# Patient Record
Sex: MALE | Race: WHITE | NOT HISPANIC OR LATINO | Employment: OTHER | URBAN - METROPOLITAN AREA
[De-identification: names, ages, dates, MRNs, and addresses within clinical notes are randomized per-mention and may not be internally consistent; named-entity substitution may affect disease eponyms.]

---

## 2017-01-12 ENCOUNTER — APPOINTMENT (OUTPATIENT)
Dept: CT IMAGING | Facility: CLINIC | Age: 62
End: 2017-01-12
Attending: EMERGENCY MEDICINE
Payer: MEDICARE

## 2017-01-12 ENCOUNTER — HOSPITAL ENCOUNTER (EMERGENCY)
Facility: CLINIC | Age: 62
Discharge: HOME | End: 2017-01-13
Attending: EMERGENCY MEDICINE | Admitting: EMERGENCY MEDICINE
Payer: MEDICARE

## 2017-01-12 DIAGNOSIS — F10.929 ALCOHOL INTOXICATION, WITH UNSPECIFIED COMPLICATION (H): ICD-10-CM

## 2017-01-12 LAB
ALBUMIN SERPL-MCNC: 3.3 G/DL (ref 3.4–5)
ALCOHOL BREATH TEST: 0.03 (ref 0–0.01)
ALP SERPL-CCNC: 81 U/L (ref 40–150)
ALT SERPL W P-5'-P-CCNC: 42 U/L (ref 0–70)
ANION GAP SERPL CALCULATED.3IONS-SCNC: 7 MMOL/L (ref 3–14)
AST SERPL W P-5'-P-CCNC: 43 U/L (ref 0–45)
BASOPHILS # BLD AUTO: 0.1 10E9/L (ref 0–0.2)
BASOPHILS NFR BLD AUTO: 0.9 %
BILIRUB SERPL-MCNC: 0.3 MG/DL (ref 0.2–1.3)
BUN SERPL-MCNC: 14 MG/DL (ref 7–30)
CALCIUM SERPL-MCNC: 8.3 MG/DL (ref 8.5–10.1)
CHLORIDE SERPL-SCNC: 107 MMOL/L (ref 94–109)
CO2 SERPL-SCNC: 32 MMOL/L (ref 20–32)
CREAT SERPL-MCNC: 1 MG/DL (ref 0.66–1.25)
DIFFERENTIAL METHOD BLD: ABNORMAL
EOSINOPHIL # BLD AUTO: 0.4 10E9/L (ref 0–0.7)
EOSINOPHIL NFR BLD AUTO: 4.7 %
ERYTHROCYTE [DISTWIDTH] IN BLOOD BY AUTOMATED COUNT: 14.7 % (ref 10–15)
ETHANOL SERPL-MCNC: 0.33 G/DL
GFR SERPL CREATININE-BSD FRML MDRD: 75 ML/MIN/1.7M2
GLUCOSE SERPL-MCNC: 94 MG/DL (ref 70–99)
HCT VFR BLD AUTO: 41.4 % (ref 40–53)
HGB BLD-MCNC: 13.8 G/DL (ref 13.3–17.7)
IMM GRANULOCYTES # BLD: 0 10E9/L (ref 0–0.4)
IMM GRANULOCYTES NFR BLD: 0.1 %
INR PPP: 0.85 (ref 0.86–1.14)
LYMPHOCYTES # BLD AUTO: 1.6 10E9/L (ref 0.8–5.3)
LYMPHOCYTES NFR BLD AUTO: 20.1 %
MAGNESIUM SERPL-MCNC: 2.4 MG/DL (ref 1.6–2.3)
MCH RBC QN AUTO: 33.2 PG (ref 26.5–33)
MCHC RBC AUTO-ENTMCNC: 33.3 G/DL (ref 31.5–36.5)
MCV RBC AUTO: 100 FL (ref 78–100)
MONOCYTES # BLD AUTO: 0.8 10E9/L (ref 0–1.3)
MONOCYTES NFR BLD AUTO: 10 %
NEUTROPHILS # BLD AUTO: 5 10E9/L (ref 1.6–8.3)
NEUTROPHILS NFR BLD AUTO: 64.2 %
NRBC # BLD AUTO: 0 10*3/UL
NRBC BLD AUTO-RTO: 0 /100
PLATELET # BLD AUTO: 178 10E9/L (ref 150–450)
POTASSIUM SERPL-SCNC: 3.7 MMOL/L (ref 3.4–5.3)
PROT SERPL-MCNC: 6.6 G/DL (ref 6.8–8.8)
RBC # BLD AUTO: 4.16 10E12/L (ref 4.4–5.9)
SODIUM SERPL-SCNC: 146 MMOL/L (ref 133–144)
WBC # BLD AUTO: 7.8 10E9/L (ref 4–11)

## 2017-01-12 PROCEDURE — 36415 COLL VENOUS BLD VENIPUNCTURE: CPT | Performed by: EMERGENCY MEDICINE

## 2017-01-12 PROCEDURE — 80320 DRUG SCREEN QUANTALCOHOLS: CPT | Performed by: EMERGENCY MEDICINE

## 2017-01-12 PROCEDURE — 99285 EMERGENCY DEPT VISIT HI MDM: CPT | Mod: 25 | Performed by: EMERGENCY MEDICINE

## 2017-01-12 PROCEDURE — 83735 ASSAY OF MAGNESIUM: CPT | Performed by: EMERGENCY MEDICINE

## 2017-01-12 PROCEDURE — 85025 COMPLETE CBC W/AUTO DIFF WBC: CPT | Performed by: EMERGENCY MEDICINE

## 2017-01-12 PROCEDURE — 72125 CT NECK SPINE W/O DYE: CPT

## 2017-01-12 PROCEDURE — 80053 COMPREHEN METABOLIC PANEL: CPT | Performed by: EMERGENCY MEDICINE

## 2017-01-12 PROCEDURE — 82075 ASSAY OF BREATH ETHANOL: CPT | Performed by: EMERGENCY MEDICINE

## 2017-01-12 PROCEDURE — 99284 EMERGENCY DEPT VISIT MOD MDM: CPT | Mod: Z6 | Performed by: EMERGENCY MEDICINE

## 2017-01-12 PROCEDURE — 70450 CT HEAD/BRAIN W/O DYE: CPT

## 2017-01-12 PROCEDURE — 85610 PROTHROMBIN TIME: CPT | Performed by: EMERGENCY MEDICINE

## 2017-01-12 ASSESSMENT — ENCOUNTER SYMPTOMS
EYE REDNESS: 0
ABDOMINAL PAIN: 0
CONFUSION: 0
DIFFICULTY URINATING: 0
NECK STIFFNESS: 0
ARTHRALGIAS: 0
SHORTNESS OF BREATH: 0
HEADACHES: 0
FEVER: 0
COLOR CHANGE: 0

## 2017-01-12 NOTE — ED AVS SNAPSHOT
[facility], [place], Emergency Department    [address] [place] [address]    Phone:  [phone]    Fax:  [phone]                                       Richard Montoya   MRN: [id number]    Department:  [facility], Emergency Department   Date of Visit:  1/12/2017           After Visit Summary Signature Page     I have received my discharge instructions, and my questions have been answered. I have discussed any challenges I see with this plan with the nurse or doctor.    ..........................................................................................................................................  Patient/Patient Representative Signature      ..........................................................................................................................................  Patient Representative Print Name and Relationship to Patient    ..................................................               ................................................  Date                                            Time    ..........................................................................................................................................  Reviewed by Signature/Title    ...................................................              ..............................................  Date                                                            Time

## 2017-01-12 NOTE — ED AVS SNAPSHOT
[facility], Emergency Department    [address]    [place] [address]    Phone:  [phone]    Fax:  [phone]                                       Richard Montoya   MRN: [id number]    Department:  [facility], Emergency Department   Date of Visit:  1/12/2017           Patient Information     Date Of Birth          1955        Your diagnoses for this visit were:     Alcohol intoxication, with unspecified complication (H)        You were seen by Bhanu Quiñones MD.        Discharge Instructions       You are being discharged to [facility].      Discharge References/Attachments     ALCOHOL ABUSE (ENGLISH)      24 Hour Appointment Hotline       To make an appointment at any [facility] clinic, call [phone] ([phone]). If you don't have a family doctor or clinic, we will help you find one. [facility] clinics are conveniently located to serve the needs of you and your family.             Review of your medicines      Our records show that you are taking the medicines listed below. If these are incorrect, please call your family doctor or clinic.        Dose / Directions Last dose taken    acetaminophen 500 MG tablet   Commonly known as:  TYLENOL   Dose:  500 mg        Take 1 tablet (500 mg) by mouth 4 times daily   Refills:  0        albuterol 108 (90 BASE) MCG/ACT Inhaler   Commonly known as:  PROAIR HFA/PROVENTIL HFA/VENTOLIN HFA   Dose:  2 puff   Quantity:  1 Inhaler        Inhale 2 puffs into the lungs every 4 hours as needed for shortness of breath / dyspnea   Refills:  0        cholecalciferol 1000 UNITS Tabs   Dose:  1000 Units   Quantity:  90 tablet        Take 1,000 Units by mouth daily   Refills:  1        fluticasone-salmeterol 500-50 MCG/DOSE diskus inhaler   Commonly known as:  ADVAIR   Dose:  1 puff   Quantity:  60 Inhaler        Inhale 1 puff into the lungs every 12 hours   Refills:  1        folic acid 1 MG tablet   Commonly known as:  FOLVITE   Dose:  1 mg    Quantity:  30 tablet        Take 1 tablet (1 mg) by mouth daily   Refills:  0        furosemide 20 MG tablet   Commonly known as:  LASIX   Dose:  20 mg   Quantity:  30 tablet        Take 1 tablet (20 mg) by mouth daily   Refills:  3        gabapentin 300 MG capsule   Commonly known as:  NEURONTIN   Dose:  300 mg   Quantity:  90 capsule        Take 1 capsule (300 mg) by mouth 3 times daily   Refills:  1        loperamide 2 MG capsule   Commonly known as:  IMODIUM   Dose:  2 mg        Take 2 mg by mouth 4 times daily as needed for diarrhea   Refills:  0        multivitamin, therapeutic with minerals Tabs tablet   Dose:  1 tablet   Quantity:  30 each        Take 1 tablet by mouth daily   Refills:  0        nicotine polacrilex 2 MG lozenge   Commonly known as:  COMMIT   Dose:  4 mg   Quantity:  360 lozenge        Place 2 lozenges (4 mg) inside cheek every hour as needed for smoking cessation   Refills:  0        oxyCODONE 5 MG IR tablet   Commonly known as:  ROXICODONE   Dose:  5 mg   Quantity:  40 tablet        Take 1 tablet (5 mg) by mouth 4 times daily as needed for moderate to severe pain   Refills:  0        senna-docusate 8.6-50 MG per tablet   Commonly known as:  SENOKOT-S;PERICOLACE   Dose:  2 tablet   Quantity:  100 tablet        Take 2 tablets by mouth 2 times daily as needed for constipation   Refills:  0        sodium chloride 0.65 % nasal spray   Commonly known as:  OCEAN   Dose:  1 spray   Quantity:  1 Bottle        Spray 1 spray into both nostrils daily as needed for congestion   Refills:  2        tiotropium 18 MCG capsule   Commonly known as:  SPIRIVA   Dose:  18 mcg   Quantity:  30 capsule        Inhale 1 capsule (18 mcg) into the lungs daily   Refills:  1          ASK your doctor about these medications        Dose / Directions Last dose taken    * Aspirin 81 MG Tbdp   Dose:  81 mg   What changed:  Another medication with the same name was added. Make sure you understand how and when to take each.    Quantity:  30 tablet   Ask about: Which instructions should I use?        Take 81 mg by mouth daily   Refills:  1        * aspirin 81 MG tablet   Dose:  81 mg   What changed:  You were already taking a medication with the same name, and this prescription was added. Make sure you understand how and when to take each.   Quantity:  3 tablet   Ask about: Which instructions should I use?        Take 1 tablet (81 mg) by mouth daily   Refills:  0        * citalopram 20 MG tablet   Commonly known as:  celeXA   Dose:  20 mg   What changed:  Another medication with the same name was added. Make sure you understand how and when to take each.   Quantity:  30 tablet   Ask about: Which instructions should I use?        Take 1 tablet (20 mg) by mouth daily   Refills:  1        * citalopram 20 MG tablet   Commonly known as:  celeXA   Dose:  20 mg   What changed:  You were already taking a medication with the same name, and this prescription was added. Make sure you understand how and when to take each.   Quantity:  3 tablet   Ask about: Which instructions should I use?        Take 1 tablet (20 mg) by mouth daily   Refills:  0        * lisinopril 2.5 MG tablet   Commonly known as:  PRINIVIL/Zestril   Dose:  2.5 mg   What changed:  Another medication with the same name was added. Make sure you understand how and when to take each.   Quantity:  30 tablet   Ask about: Which instructions should I use?        Take 1 tablet (2.5 mg) by mouth daily   Refills:  3        * lisinopril 2.5 MG tablet   Commonly known as:  PRINIVIL/Zestril   Dose:  2.5 mg   What changed:  You were already taking a medication with the same name, and this prescription was added. Make sure you understand how and when to take each.   Quantity:  3 tablet   Ask about: Which instructions should I use?        Take 1 tablet (2.5 mg) by mouth daily   Refills:  0        * metoprolol 50 MG 24 hr tablet   Commonly known as:  TOPROL-XL   Dose:  50 mg   What changed:   Another medication with the same name was added. Make sure you understand how and when to take each.   Quantity:  30 tablet   Ask about: Which instructions should I use?        Take 1 tablet (50 mg) by mouth daily   Refills:  3        * metoprolol 50 MG 24 hr tablet   Commonly known as:  TOPROL-XL   Dose:  50 mg   What changed:  You were already taking a medication with the same name, and this prescription was added. Make sure you understand how and when to take each.   Quantity:  3 tablet   Ask about: Which instructions should I use?        Take 1 tablet (50 mg) by mouth daily   Refills:  0        * Notice:  This list has 8 medication(s) that are the same as other medications prescribed for you. Read the directions carefully, and ask your doctor or other care provider to review them with you.            Prescriptions were sent or printed at these locations (4 Prescriptions)                   Other Prescriptions                Printed at Department/Unit printer (4 of 4)         metoprolol (TOPROL-XL) 50 MG 24 hr tablet               lisinopril (PRINIVIL/ZESTRIL) 2.5 MG tablet               citalopram (CELEXA) 20 MG tablet               aspirin 81 MG tablet                Procedures and tests performed during your visit     Alcohol breath test POCT    Alcohol level blood    CBC with platelets differential    Cervical spine CT w/o contrast    Comprehensive metabolic panel    Head CT w/o contrast    INR    MSSA Score, VS, and Pain Assessment on admission and per protocol    Magnesium    Peripheral IV catheter      Orders Needing Specimen Collection     Ordered          01/12/17 2003  Drug abuse screen 6 urine (tox) - STAT, [facility]: STAT, Needs to be Collected     Scheduled Task Status   01/12/17 2003 Collect Drug abuse screen 6 urine (tox) Open   Order Class:  PCU Collect                  Pending Results     No orders found for last 2 day(s).            Pending Culture Results     No orders found for last 2 day(s).        "     Thank you for choosing [facility]       Thank you for choosing [facility] for your care. Our goal is always to provide you with excellent care. Hearing back from our patients is one way we can continue to improve our services. Please take a few minutes to complete the written survey that you may receive in the mail after you visit with us. Thank you!        [employer] Information     [employer] lets you send messages to your doctor, view your test results, renew your prescriptions, schedule appointments and more. To sign up, go to [email or web address] . Click on \"Log in\" on the left side of the screen, which will take you to the Welcome page. Then click on \"Sign up Now\" on the right side of the page.     You will be asked to enter the access code listed below, as well as some personal information. Please follow the directions to create your username and password.     Your access code is: [email or web address]  Expires: 2017  8:21 AM     Your access code will  in 90 days. If you need help or a new code, please call your [facility] clinic or [phone].        Care EveryWhere ID     This is your Care EveryWhere ID. This could be used by other organizations to access your [facility] medical records  [id number]        After Visit Summary       This is your record. Keep this with you and show to your community pharmacist(s) and doctor(s) at your next visit.                  "

## 2017-01-13 ENCOUNTER — TELEPHONE (OUTPATIENT)
Dept: BEHAVIORAL HEALTH | Facility: CLINIC | Age: 62
End: 2017-01-13

## 2017-01-13 VITALS
OXYGEN SATURATION: 96 % | HEART RATE: 68 BPM | TEMPERATURE: 96.5 F | DIASTOLIC BLOOD PRESSURE: 89 MMHG | RESPIRATION RATE: 18 BRPM | SYSTOLIC BLOOD PRESSURE: 150 MMHG

## 2017-01-13 PROCEDURE — A9270 NON-COVERED ITEM OR SERVICE: HCPCS | Mod: GY | Performed by: EMERGENCY MEDICINE

## 2017-01-13 PROCEDURE — 25000132 ZZH RX MED GY IP 250 OP 250 PS 637: Mod: GY | Performed by: EMERGENCY MEDICINE

## 2017-01-13 RX ORDER — DIAZEPAM 5 MG
5-20 TABLET ORAL EVERY 30 MIN PRN
Status: DISCONTINUED | OUTPATIENT
Start: 2017-01-13 | End: 2017-01-13 | Stop reason: HOSPADM

## 2017-01-13 RX ORDER — LISINOPRIL 2.5 MG/1
2.5 TABLET ORAL DAILY
Qty: 3 TABLET | Refills: 0 | Status: ON HOLD | OUTPATIENT
Start: 2017-01-13 | End: 2017-09-21

## 2017-01-13 RX ORDER — CITALOPRAM HYDROBROMIDE 20 MG/1
20 TABLET ORAL DAILY
Qty: 3 TABLET | Refills: 0 | Status: ON HOLD | OUTPATIENT
Start: 2017-01-13 | End: 2017-09-21

## 2017-01-13 RX ORDER — DIAZEPAM 10 MG
10 TABLET ORAL ONCE
Status: COMPLETED | OUTPATIENT
Start: 2017-01-13 | End: 2017-01-13

## 2017-01-13 RX ORDER — METOPROLOL SUCCINATE 50 MG/1
50 TABLET, EXTENDED RELEASE ORAL DAILY
Qty: 3 TABLET | Refills: 0 | Status: ON HOLD | OUTPATIENT
Start: 2017-01-13 | End: 2017-09-21

## 2017-01-13 RX ADMIN — DIAZEPAM 10 MG: 10 TABLET ORAL at 04:34

## 2017-01-13 RX ADMIN — DIAZEPAM 10 MG: 10 TABLET ORAL at 06:15

## 2017-01-13 RX ADMIN — DIAZEPAM 10 MG: 5 TABLET ORAL at 08:15

## 2017-01-13 NOTE — ED PROVIDER NOTES
History     Chief Complaint   Patient presents with     Alcohol Intoxication     pt lying in tirado outside his apartment, intoxicated unable to stand on own. olaf state it happens almost daily recently     HPI  Richard Montoya is a 61 year old male who presents to the emergency department with alcohol intoxication.  The patient was found lying in the tirado outside his apartment.  The patient states he fell down.  The patient states that he did strike his head.  He has an abrasion on his left index finger that he states happened several days ago.  He denies any left hand pain.  Patient denies chest pain or dyspnea.  He denies abdominal pain.  Denies nausea and vomiting.  Patient reports a history of daily alcohol use.  He states he drinks 2 pints of vodka per day.  He states that he becomes tremulous when he attempts to quit.  He denies any other drug use.  Patient denies depression and suicide ideation.  Patient states that he's also had a history of alcohol withdrawal seizure ×1 in the past.  He denies a history of DTs.    I have reviewed the Medications, Allergies, Past Medical and Surgical History, and Social History in the Epic system.    Review of Systems   Constitutional: Negative for fever.   HENT: Negative for congestion.    Eyes: Negative for redness.   Respiratory: Negative for shortness of breath.    Cardiovascular: Negative for chest pain.   Gastrointestinal: Negative for abdominal pain.   Genitourinary: Negative for difficulty urinating.   Musculoskeletal: Negative for arthralgias and neck stiffness.   Skin: Negative for color change.   Neurological: Negative for headaches.   Psychiatric/Behavioral: Negative for confusion.   All other systems reviewed and are negative.      Physical Exam   BP: 117/78 mmHg  Pulse: 90  Resp: 14  SpO2: 96 %  Physical Exam   Constitutional: He is oriented to person, place, and time. He appears well-developed and well-nourished. No distress.   HENT:   Head: Normocephalic.        Right Ear: External ear normal.   Left Ear: External ear normal.   Mouth/Throat: Oropharynx is clear and moist. No oropharyngeal exudate.   Eyes: Pupils are equal, round, and reactive to light. No scleral icterus.   Cardiovascular: Normal heart sounds and intact distal pulses.  An irregularly irregular rhythm present.   Pulmonary/Chest: Effort normal and breath sounds normal. No respiratory distress.   Abdominal: Soft. Bowel sounds are normal. There is no tenderness.   Musculoskeletal: Normal range of motion. He exhibits no edema or tenderness.   Neurological: He is alert and oriented to person, place, and time. He has normal strength. No cranial nerve deficit. GCS eye subscore is 4. GCS verbal subscore is 4. GCS motor subscore is 6.   Skin: Skin is warm. No rash noted. He is not diaphoretic.   Psychiatric: His speech is slurred. He expresses no suicidal ideation.   Nursing note and vitals reviewed.      ED Course   Procedures            Critical Care time:    Results for orders placed or performed during the hospital encounter of 01/12/17   Head CT w/o contrast    Narrative    CT OF THE HEAD WITHOUT CONTRAST 1/12/2017 10:14 PM     COMPARISON: Head CT 3/9/2016    HISTORY: Pain, trauma    TECHNIQUE: 5 mm thick axial CT images of the head were acquired  without IV contrast material.    FINDINGS: A chronic moderate-sized right MCA territory infarct is  again noted. There is mild diffuse cerebral volume loss. There are  subtle patchy areas of decreased density in the cerebral white matter  bilaterally that are consistent with sequela of chronic small vessel  ischemic disease.    The ventricles and basal cisterns are within normal limits in  configuration given the degree of cerebral volume loss.  There is no  midline shift. There are no extra-axial fluid collections.    No intracranial hemorrhage, mass or recent infarct.    The visualized paranasal sinuses are well-aerated. There is no  mastoiditis. There are no  fractures of the visualized bones.      Impression    IMPRESSION: Diffuse cerebral volume loss and cerebral white matter  changes consistent with chronic small vessel ischemic disease. No  evidence for acute intracranial pathology. No change from the  comparison study.        Radiation dose for this scan was reduced using automated exposure  control, adjustment of the mA and/or kV according to patient size, or  iterative reconstruction technique    JONI GRAY MD   Cervical spine CT w/o contrast    Narrative    CT OF THE CERVICAL SPINE WITHOUT CONTRAST   1/12/2017 10:15 PM     COMPARISON: None    HISTORY: Altered mental status, fall     TECHNIQUE: Axial images of the cervical spine were acquired without  intravenous contrast. Multiplanar reformations were created.      FINDINGS: There is normal alignment of the cervical vertebrae.  Vertebral body heights of the cervical spine are normal.  Craniocervical alignment is normal. There are no fractures of the  cervical spine. There is degenerative endplate spurring at C4-C5,  C5-C6, C6-C7 and C7-T1 levels.      Impression    IMPRESSION: No evidence for fracture or traumatic malalignment of the  cervical spine.      Radiation dose for this scan was reduced using automated exposure  control, adjustment of the mA and/or kV according to patient size, or  iterative reconstruction technique    JONI GRAY MD   CBC with platelets differential   Result Value Ref Range    WBC 7.8 4.0 - 11.0 10e9/L    RBC Count 4.16 (L) 4.4 - 5.9 10e12/L    Hemoglobin 13.8 13.3 - 17.7 g/dL    Hematocrit 41.4 40.0 - 53.0 %     78 - 100 fl    MCH 33.2 (H) 26.5 - 33.0 pg    MCHC 33.3 31.5 - 36.5 g/dL    RDW 14.7 10.0 - 15.0 %    Platelet Count 178 150 - 450 10e9/L    Diff Method Automated Method     % Neutrophils 64.2 %    % Lymphocytes 20.1 %    % Monocytes 10.0 %    % Eosinophils 4.7 %    % Basophils 0.9 %    % Immature Granulocytes 0.1 %    Nucleated RBCs 0 0 /100    Absolute  Neutrophil 5.0 1.6 - 8.3 10e9/L    Absolute Lymphocytes 1.6 0.8 - 5.3 10e9/L    Absolute Monocytes 0.8 0.0 - 1.3 10e9/L    Absolute Eosinophils 0.4 0.0 - 0.7 10e9/L    Absolute Basophils 0.1 0.0 - 0.2 10e9/L    Abs Immature Granulocytes 0.0 0 - 0.4 10e9/L    Absolute Nucleated RBC 0.0    Comprehensive metabolic panel   Result Value Ref Range    Sodium 146 (H) 133 - 144 mmol/L    Potassium 3.7 3.4 - 5.3 mmol/L    Chloride 107 94 - 109 mmol/L    Carbon Dioxide 32 20 - 32 mmol/L    Anion Gap 7 3 - 14 mmol/L    Glucose 94 70 - 99 mg/dL    Urea Nitrogen 14 7 - 30 mg/dL    Creatinine 1.00 0.66 - 1.25 mg/dL    GFR Estimate 75 >60 mL/min/1.7m2    GFR Estimate If Black >90   GFR Calc   >60 mL/min/1.7m2    Calcium 8.3 (L) 8.5 - 10.1 mg/dL    Bilirubin Total 0.3 0.2 - 1.3 mg/dL    Albumin 3.3 (L) 3.4 - 5.0 g/dL    Protein Total 6.6 (L) 6.8 - 8.8 g/dL    Alkaline Phosphatase 81 40 - 150 U/L    ALT 42 0 - 70 U/L    AST 43 0 - 45 U/L   INR   Result Value Ref Range    INR 0.85 (L) 0.86 - 1.14   Magnesium   Result Value Ref Range    Magnesium 2.4 (H) 1.6 - 2.3 mg/dL   Alcohol level blood   Result Value Ref Range    Ethanol g/dL 0.33 (HH) <0.01 g/dL   Alcohol breath test POCT   Result Value Ref Range    Alcohol Breath Test 0.0297 (A) 0.00 - 0.01               Assessments & Plan (with Medical Decision Making)   61 year old male to emergency by her with alcohol intoxication after he was found down in the hallway of his apartment.  The patient has a frontal contusion but no evidence for intracranial hemorrhage or cervical spine fracture on CT imaging.  The patient is intoxicated but otherwise has normal labs.  There are no detox beds available.  He will be allowed to sober overnight.  Anticipate discharged home at that time.  I have reviewed the nursing notes.    I have reviewed the findings, diagnosis, plan and need for follow up with the patient.    New Prescriptions    No medications on file       Final diagnoses:    Alcohol intoxication, with unspecified complication (H)       1/12/2017   [facility], [place], EMERGENCY DEPARTMENT      Bhanu Quiñones MD  01/13/17 0033

## 2017-01-13 NOTE — ED NOTES
60 yo M with alcohol intoxication and a fall.  Patient monitored overnight and had symptoms of withdrawal.  He was treated with Valium.  He requested detox and will be admitted to [facility].      Cong Mclain MD  01/13/17 0729

## 2017-01-13 NOTE — ED NOTES
Bed: ED10  Expected date: 1/12/17  Expected time:   Means of arrival:   Comments:  Ronnie Gabriel    60 yo M  intoxicated

## 2017-01-13 NOTE — TELEPHONE ENCOUNTER
Dr. Mclain provides clinical:    S: Pt BIB EMS to [facility] ED due to alcohol intoxication  B: Drinks about 2 pts of vodka daily, hx of seizures with withdrawals. Pt had a fall and was worked up and they found no concerns.Shaky and hypertensive, tachycardia. No MH concerns. Hx of MRS, not active currently. No Upper respiratory infection or wounds.   A: Voluntary for alcohol detox, BAL  0.33, given 20 mg valium given.   R: No Beds currently, pt waiting in ED for detox bed    Dr. Lawton called back and states pt will be going to [place] detox, no longer waiting for bed at [facility]-canceled

## 2017-01-20 ENCOUNTER — HOSPITAL ENCOUNTER (EMERGENCY)
Facility: CLINIC | Age: 62
Discharge: HOME | End: 2017-01-20
Attending: EMERGENCY MEDICINE | Admitting: EMERGENCY MEDICINE
Payer: MEDICARE

## 2017-01-20 VITALS
SYSTOLIC BLOOD PRESSURE: 145 MMHG | RESPIRATION RATE: 14 BRPM | DIASTOLIC BLOOD PRESSURE: 98 MMHG | TEMPERATURE: 97.7 F | HEIGHT: 73 IN | WEIGHT: 204 LBS | OXYGEN SATURATION: 99 % | BODY MASS INDEX: 27.04 KG/M2

## 2017-01-20 DIAGNOSIS — R07.9 CHEST PAIN, UNSPECIFIED TYPE: ICD-10-CM

## 2017-01-20 DIAGNOSIS — F10.129 ALCOHOL ABUSE WITH INTOXICATION (H): ICD-10-CM

## 2017-01-20 LAB
ALCOHOL BREATH TEST: 0.25 (ref 0–0.01)
ANION GAP SERPL CALCULATED.3IONS-SCNC: 12 MMOL/L (ref 3–14)
BASOPHILS # BLD AUTO: 0.1 10E9/L (ref 0–0.2)
BASOPHILS NFR BLD AUTO: 1.4 %
BUN SERPL-MCNC: 17 MG/DL (ref 7–30)
CALCIUM SERPL-MCNC: 7.9 MG/DL (ref 8.5–10.1)
CHLORIDE SERPL-SCNC: 107 MMOL/L (ref 94–109)
CO2 SERPL-SCNC: 22 MMOL/L (ref 20–32)
CREAT SERPL-MCNC: 1.09 MG/DL (ref 0.66–1.25)
DIFFERENTIAL METHOD BLD: ABNORMAL
EOSINOPHIL # BLD AUTO: 0.3 10E9/L (ref 0–0.7)
EOSINOPHIL NFR BLD AUTO: 3.9 %
ERYTHROCYTE [DISTWIDTH] IN BLOOD BY AUTOMATED COUNT: 14.6 % (ref 10–15)
GFR SERPL CREATININE-BSD FRML MDRD: 68 ML/MIN/1.7M2
GLUCOSE SERPL-MCNC: 98 MG/DL (ref 70–99)
HCT VFR BLD AUTO: 47.7 % (ref 40–53)
HGB BLD-MCNC: 15.3 G/DL (ref 13.3–17.7)
IMM GRANULOCYTES # BLD: 0 10E9/L (ref 0–0.4)
IMM GRANULOCYTES NFR BLD: 0.3 %
LYMPHOCYTES # BLD AUTO: 1.9 10E9/L (ref 0.8–5.3)
LYMPHOCYTES NFR BLD AUTO: 27 %
MCH RBC QN AUTO: 33 PG (ref 26.5–33)
MCHC RBC AUTO-ENTMCNC: 32.1 G/DL (ref 31.5–36.5)
MCV RBC AUTO: 103 FL (ref 78–100)
MONOCYTES # BLD AUTO: 0.5 10E9/L (ref 0–1.3)
MONOCYTES NFR BLD AUTO: 6.5 %
NEUTROPHILS # BLD AUTO: 4.2 10E9/L (ref 1.6–8.3)
NEUTROPHILS NFR BLD AUTO: 60.9 %
NRBC # BLD AUTO: 0 10*3/UL
NRBC BLD AUTO-RTO: 0 /100
NT-PROBNP SERPL-MCNC: 708 PG/ML (ref 0–900)
PLATELET # BLD AUTO: 190 10E9/L (ref 150–450)
POTASSIUM SERPL-SCNC: 4.8 MMOL/L (ref 3.4–5.3)
RBC # BLD AUTO: 4.63 10E12/L (ref 4.4–5.9)
SODIUM SERPL-SCNC: 142 MMOL/L (ref 133–144)
TROPONIN I SERPL-MCNC: 0.02 UG/L (ref 0–0.04)
WBC # BLD AUTO: 7 10E9/L (ref 4–11)

## 2017-01-20 PROCEDURE — 93005 ELECTROCARDIOGRAM TRACING: CPT | Performed by: EMERGENCY MEDICINE

## 2017-01-20 PROCEDURE — 99285 EMERGENCY DEPT VISIT HI MDM: CPT | Mod: 25 | Performed by: EMERGENCY MEDICINE

## 2017-01-20 PROCEDURE — 25000128 H RX IP 250 OP 636: Performed by: EMERGENCY MEDICINE

## 2017-01-20 PROCEDURE — 96360 HYDRATION IV INFUSION INIT: CPT | Performed by: EMERGENCY MEDICINE

## 2017-01-20 PROCEDURE — 93010 ELECTROCARDIOGRAM REPORT: CPT | Mod: Z6 | Performed by: EMERGENCY MEDICINE

## 2017-01-20 PROCEDURE — 83880 ASSAY OF NATRIURETIC PEPTIDE: CPT | Performed by: EMERGENCY MEDICINE

## 2017-01-20 PROCEDURE — 96361 HYDRATE IV INFUSION ADD-ON: CPT | Performed by: EMERGENCY MEDICINE

## 2017-01-20 PROCEDURE — 84484 ASSAY OF TROPONIN QUANT: CPT | Performed by: EMERGENCY MEDICINE

## 2017-01-20 PROCEDURE — 25000125 ZZHC RX 250: Performed by: EMERGENCY MEDICINE

## 2017-01-20 PROCEDURE — 80048 BASIC METABOLIC PNL TOTAL CA: CPT | Performed by: EMERGENCY MEDICINE

## 2017-01-20 PROCEDURE — 85025 COMPLETE CBC W/AUTO DIFF WBC: CPT | Performed by: EMERGENCY MEDICINE

## 2017-01-20 PROCEDURE — 96372 THER/PROPH/DIAG INJ SC/IM: CPT | Performed by: EMERGENCY MEDICINE

## 2017-01-20 PROCEDURE — 40000141 ZZH STATISTIC PERIPHERAL IV START W/O US GUIDANCE

## 2017-01-20 PROCEDURE — 40000802 ZZH SITE CHECK

## 2017-01-20 RX ORDER — LORAZEPAM 2 MG/ML
1 INJECTION INTRAMUSCULAR ONCE
Status: COMPLETED | OUTPATIENT
Start: 2017-01-20 | End: 2017-01-20

## 2017-01-20 RX ORDER — HALOPERIDOL 5 MG/ML
2 INJECTION INTRAMUSCULAR ONCE
Status: COMPLETED | OUTPATIENT
Start: 2017-01-20 | End: 2017-01-20

## 2017-01-20 RX ORDER — DIPHENHYDRAMINE HYDROCHLORIDE 50 MG/ML
25 INJECTION INTRAMUSCULAR; INTRAVENOUS ONCE
Status: COMPLETED | OUTPATIENT
Start: 2017-01-20 | End: 2017-01-20

## 2017-01-20 RX ORDER — LIDOCAINE 40 MG/G
CREAM TOPICAL
Status: DISCONTINUED | OUTPATIENT
Start: 2017-01-20 | End: 2017-01-21 | Stop reason: HOSPADM

## 2017-01-20 RX ORDER — SODIUM CHLORIDE 9 MG/ML
1000 INJECTION, SOLUTION INTRAVENOUS CONTINUOUS
Status: DISCONTINUED | OUTPATIENT
Start: 2017-01-20 | End: 2017-01-21 | Stop reason: HOSPADM

## 2017-01-20 RX ADMIN — HALOPERIDOL LACTATE 2 MG: 5 INJECTION, SOLUTION INTRAMUSCULAR at 15:32

## 2017-01-20 RX ADMIN — SODIUM CHLORIDE 1000 ML: 9 INJECTION, SOLUTION INTRAVENOUS at 20:11

## 2017-01-20 RX ADMIN — SODIUM CHLORIDE 1000 ML: 900 INJECTION, SOLUTION INTRAVENOUS at 17:29

## 2017-01-20 RX ADMIN — LORAZEPAM 1 MG: 2 INJECTION INTRAMUSCULAR; INTRAVENOUS at 15:33

## 2017-01-20 RX ADMIN — DIPHENHYDRAMINE HYDROCHLORIDE 25 MG: 50 INJECTION, SOLUTION INTRAMUSCULAR; INTRAVENOUS at 15:34

## 2017-01-20 ASSESSMENT — ENCOUNTER SYMPTOMS
HEADACHES: 1
SHORTNESS OF BREATH: 1
CHEST TIGHTNESS: 1

## 2017-01-20 NOTE — ED AVS SNAPSHOT
" [facility], Emergency Department    [address] [facility] [address]    Phone:  [phone]                                       Richard Montoya   MRN: [id number]    Department:  [facility], Emergency Department   Date of Visit:  1/20/2017           Patient Information     Date Of Birth          1955        Your diagnoses for this visit were:     Alcohol abuse with intoxication (H)     Chest pain, unspecified type        You were seen by Daquan David MD.        Discharge Instructions         Alcohol Abuse  Alcoholic drinks are harmful when you have too many of them. There is no set number of drinks that defines too much. Drinking that disrupts your life or your health is called alcohol abuse. Alcohol abuse can hurt your relationships with others. You may lose friends, a spouse, or even your job. You may be abusing alcohol if any of the following are true for you:    Duties at home or with  suffer because of drinking.    Duties at work or in school suffer because of drinking.    You have missed work or school because of drinking.    You use alcohol while driving or operating machinery.    You have legal problems such as arrests due to drinking.    You keep drinking even though it causes serious problems in your life.  Health effects  Alcohol abuse causes health problems. Sometimes this can happen after only drinking a  little.\" There is no set number of drinks or amount of alcohol that defines too much. The more you drink at one time, and the more often you drink determine both the short-term and long-term health effects. It affects all parts of your body and your health, including your:    Brain. Alcohol is a central nervous system depressant. It can damage parts of the brain that affect your balance, memory, thinking, and emotions. It can cause memory loss, blackouts, depression, agitation, sleep cycle changes, and seizures. These changes may or may not be reversible.    Heart " and vascular system. Alcohol affects multiple areas. It can damage heart muscle causing cardiomyopathy, which is a weakening and stretching of the heart muscle. This can lead to trouble breathing, an irregular heartbeat, atrial fibrillation, leg swelling, and heart failure. Alcohol use makes the blood vessels stiffen causing hypertension (high blood pressure). All of these problems increase your risk of having heart attacks or strokes.    Liver. Alcohol causes fat to build up in the liver, affecting its normal function. This increases the risk for hepatitis, leading to abdominal pain, appetite loss, jaundice, bleeding problems, liver fibrosis, and cirrhosis. This, in turn, can affect your ability to fight off infections, and can cause diabetes. The liver changes prevent it from removing toxins in your blood that can cause encephalopathy which may show with confusion, altered level of consciousness, personality changes, memory loss, seizures, coma, and death.    Pancreas. Alcohol can cause inflammation of the pancreas, or pancreatitis. This can cause abdominal pain, fever, and diabetes.    Immune system. Alcohol weakens your immune system in a number of ways. It suppresses your immune system making it harder to fight infections and colds. It also increases the chance of getting pneumonia and tuberculosis.    Cancer. Alcohol is a risk factor for developing cancer of the mouth, esophagus, pharynx, larynx, liver, and breast.    Sexual function. Alcohol can lead to sexual problems.  Home care  The following guidelines will help you deal with alcohol abuse:    Admit you have a problem with alcohol.    Ask for help from your health care provider and trusted family members or close friends.    Get help from people trained in dealing with alcohol abuse. This may be individual counseling or group therapy, or it may be a supervised alcohol treatment program.    Join a self-help group for alcohol abuse such as Alcoholics  Anonymous (AA).    Avoid people who abuse alcohol or tempt you to drink.  Follow-up care  Follow up as advised by the doctor or our staff. Contact these groups to get help:    Alcoholics Anonymous (AA): Go to www.aa.org or check the phone book for meetings near you.    National Alcohol and Substance Abuse Information Center (NASAIC): 524.851.8889 www.addictionXRONet.mimoOn    National [place] on Alcoholism and Drug Dependence (NCADD): 883-GJI-OCIU (639-8947) www.ncadd.org    Al-Anon: 764-0QX-SRES (937-0586) www.al-anon.org  Call 911  Call 911 if any of these occur:    Trouble breathing or slow irregular breathing    Chest pain    Sudden weakness on one side of your body or sudden trouble speaking    Heavy bleeding or vomiting blood    Very drowsy or trouble awakening    Fainting or loss of consciousness    Rapid heart rate    Seizure  When to seek medical care  Get prompt medical attention if you have:    Confusion    Hallucinations (seeing, hearing, or feeling things that aren t there)    Pain in your upper abdomen that gets worse    Repeated vomiting or black or tarry stools    Severe shakiness    9463-4842 [employer]. [address] [place] [address]. All rights reserved. This information is not intended as a substitute for professional medical care. Always follow your healthcare professional's instructions.          24 Hour Appointment Hotline       To make an appointment at any [facility], call [phone] ([phone]). If you don't have a family doctor or clinic, we will help you find one. [facility] clinics are conveniently located to serve the needs of you and your family.             Review of your medicines      Our records show that you are taking the medicines listed below. If these are incorrect, please call your family doctor or clinic.        Dose / Directions Last dose taken    acetaminophen 500 MG tablet   Commonly known as:  TYLENOL   Dose:  500 mg        Take 1  tablet (500 mg) by mouth 4 times daily   Refills:  0        albuterol 108 (90 BASE) MCG/ACT Inhaler   Commonly known as:  PROAIR HFA/PROVENTIL HFA/VENTOLIN HFA   Dose:  2 puff   Quantity:  1 Inhaler        Inhale 2 puffs into the lungs every 4 hours as needed for shortness of breath / dyspnea   Refills:  0        * Aspirin 81 MG Tbdp   Dose:  81 mg   Quantity:  30 tablet        Take 81 mg by mouth daily   Refills:  1        * aspirin 81 MG tablet   Dose:  81 mg   Quantity:  3 tablet        Take 1 tablet (81 mg) by mouth daily   Refills:  0        cholecalciferol 1000 UNITS Tabs   Dose:  1000 Units   Quantity:  90 tablet        Take 1,000 Units by mouth daily   Refills:  1        * citalopram 20 MG tablet   Commonly known as:  celeXA   Dose:  20 mg   Quantity:  30 tablet        Take 1 tablet (20 mg) by mouth daily   Refills:  1        * citalopram 20 MG tablet   Commonly known as:  celeXA   Dose:  20 mg   Quantity:  3 tablet        Take 1 tablet (20 mg) by mouth daily   Refills:  0        fluticasone-salmeterol 500-50 MCG/DOSE diskus inhaler   Commonly known as:  ADVAIR   Dose:  1 puff   Quantity:  60 Inhaler        Inhale 1 puff into the lungs every 12 hours   Refills:  1        folic acid 1 MG tablet   Commonly known as:  FOLVITE   Dose:  1 mg   Quantity:  30 tablet        Take 1 tablet (1 mg) by mouth daily   Refills:  0        furosemide 20 MG tablet   Commonly known as:  LASIX   Dose:  20 mg   Quantity:  30 tablet        Take 1 tablet (20 mg) by mouth daily   Refills:  3        gabapentin 300 MG capsule   Commonly known as:  NEURONTIN   Dose:  300 mg   Quantity:  90 capsule        Take 1 capsule (300 mg) by mouth 3 times daily   Refills:  1        * lisinopril 2.5 MG tablet   Commonly known as:  PRINIVIL/Zestril   Dose:  2.5 mg   Quantity:  30 tablet        Take 1 tablet (2.5 mg) by mouth daily   Refills:  3        * lisinopril 2.5 MG tablet   Commonly known as:  PRINIVIL/Zestril   Dose:  2.5 mg   Quantity:  3  tablet        Take 1 tablet (2.5 mg) by mouth daily   Refills:  0        loperamide 2 MG capsule   Commonly known as:  IMODIUM   Dose:  2 mg        Take 2 mg by mouth 4 times daily as needed for diarrhea   Refills:  0        * metoprolol 50 MG 24 hr tablet   Commonly known as:  TOPROL-XL   Dose:  50 mg   Quantity:  30 tablet        Take 1 tablet (50 mg) by mouth daily   Refills:  3        * metoprolol 50 MG 24 hr tablet   Commonly known as:  TOPROL-XL   Dose:  50 mg   Quantity:  3 tablet        Take 1 tablet (50 mg) by mouth daily   Refills:  0        multivitamin, therapeutic with minerals Tabs tablet   Dose:  1 tablet   Quantity:  30 each        Take 1 tablet by mouth daily   Refills:  0        nicotine polacrilex 2 MG lozenge   Commonly known as:  COMMIT   Dose:  4 mg   Quantity:  360 lozenge        Place 2 lozenges (4 mg) inside cheek every hour as needed for smoking cessation   Refills:  0        senna-docusate 8.6-50 MG per tablet   Commonly known as:  SENOKOT-S;PERICOLACE   Dose:  2 tablet   Quantity:  100 tablet        Take 2 tablets by mouth 2 times daily as needed for constipation   Refills:  0        sodium chloride 0.65 % nasal spray   Commonly known as:  OCEAN   Dose:  1 spray   Quantity:  1 Bottle        Spray 1 spray into both nostrils daily as needed for congestion   Refills:  2        tiotropium 18 MCG capsule   Commonly known as:  SPIRIVA   Dose:  18 mcg   Quantity:  30 capsule        Inhale 1 capsule (18 mcg) into the lungs daily   Refills:  1        * Notice:  This list has 8 medication(s) that are the same as other medications prescribed for you. Read the directions carefully, and ask your doctor or other care provider to review them with you.            Procedures and tests performed during your visit     Procedure/Test Number of Times Performed    Alcohol breath test POCT 1    Basic metabolic panel 1    CBC with platelets differential 1    EKG 12 lead 2    EKG 12-lead, tracing only 1    Nt  "probnp inpatient (BNP) 1    Peripheral IV catheter 1    Troponin I 1    Vascular Access Care Adult IP Consult 1      Orders Needing Specimen Collection     None      Pending Results     Date and Time Order Name Status Description    2017 1452 EKG 12 lead Preliminary             Pending Culture Results     No orders found from 2017 to 2017.            Thank you for choosing [facility]       Thank you for choosing [facility] for your care. Our goal is always to provide you with excellent care. Hearing back from our patients is one way we can continue to improve our services. Please take a few minutes to complete the written survey that you may receive in the mail after you visit with us. Thank you!        [facility] [employer] Information     [employer] lets you send messages to your doctor, view your test results, renew your prescriptions, schedule appointments and more. To sign up, go to [email or web address] . Click on \"Log in\" on the left side of the screen, which will take you to the Welcome page. Then click on \"Sign up Now\" on the right side of the page.     You will be asked to enter the access code listed below, as well as some personal information. Please follow the directions to create your username and password.     Your access code is: [email or web address]  Expires: 2017  8:21 AM     Your access code will  in 90 days. If you need help or a new code, please call your [facility] clinic or [phone].        Care EveryWhere ID     This is your Care EveryWhere ID. This could be used by other organizations to access your [facility] medical records  [id number]        After Visit Summary       This is your record. Keep this with you and show to your community pharmacist(s) and doctor(s) at your next visit.                  "

## 2017-01-20 NOTE — ED PROVIDER NOTES
"  History     Chief Complaint   Patient presents with     Chest Pain     HPI  Richard Montoya is a 61 year old male with a history of alcohol abuse and atrial fibrillation who presents to the Emergency Department with dizziness. He states that he drank about a pint of vodka today. He states that he has a headache and chest tightness that makes it difficult to breathe. He states that he has the shakes with withdrawal.     History is limited secondary to alcohol intoxication. He has a history of non-hodgkin's lymphoma and underwent chemotherapy. He is now in remission.     I have reviewed the Medications, Allergies, Past Medical and Surgical History, and Social History in the [employer] system.    PAST MEDICAL HISTORY  Past Medical History   Diagnosis Date     Substance abuse Ongoing     Alcohol, cocaine (nasal ingestion)     Cancer (H) 2001     Non- hodgkin's Lymphoma with current remission     Hypertension      Unspecified cerebral artery occlusion with cerebral infarction 2003     Arthritis      TBI (traumatic brain injury) (H)      Atrial fibrillation (H)      On chronic couadin, goal INR 2-3     Osteoarthritis      Bilateral hips.      Asthma Since childhood     Denies COPD hx. Last PFTs not available for reveiw     Pulmonary nodules      \"Too small to be anything\". Following with oncology with f/u studies.      Tobacco dependence age 12     1ppd.      PAST SURGICAL HISTORY  Past Surgical History   Procedure Laterality Date     Orthopedic surgery  1994     Left DANIELA     Laparotomy exploratory       Abdomen 2/2 concern for cancer lesions. Biopsies negative per patient     Appendectomy       Esophagoscopy, gastroscopy, duodenoscopy (egd), combined  1/19/2014     Procedure: COMBINED ESOPHAGOSCOPY, GASTROSCOPY, DUODENOSCOPY (EGD);;  Surgeon: Saw Dupree MD;  Location:  GI     FAMILY HISTORY  Family History   Problem Relation Age of Onset     C.A.D. No family hx of      DIABETES No family hx of      CANCER No " family hx of      Including colon cancer     Alcohol/Drug No family hx of      Asthma No family hx of      Substance Abuse Mother      Substance Abuse Father      Substance Abuse Maternal Grandmother      Substance Abuse Maternal Grandfather      Substance Abuse Paternal Grandmother      Substance Abuse Paternal Grandfather      Suicide Paternal Grandfather      Intellectual Disability (Mental Retardation) Sister      SOCIAL HISTORY  Social History   Substance Use Topics     Smoking status: Current Every Day Smoker -- 1.50 packs/day for 47 years     Types: Cigarettes     Smokeless tobacco: Never Used     Alcohol Use: Yes      Comment: 2 pints vodka daily x40 years     MEDICATIONS  No current facility-administered medications for this encounter.     Current Outpatient Prescriptions   Medication     metoprolol (TOPROL-XL) 50 MG 24 hr tablet     lisinopril (PRINIVIL/ZESTRIL) 2.5 MG tablet     citalopram (CELEXA) 20 MG tablet     aspirin 81 MG tablet     lisinopril (PRINIVIL,ZESTRIL) 2.5 MG tablet     metoprolol (TOPROL-XL) 50 MG 24 hr tablet     furosemide (LASIX) 20 MG tablet     albuterol (PROAIR HFA, PROVENTIL HFA, VENTOLIN HFA) 108 (90 BASE) MCG/ACT inhaler     folic acid (FOLVITE) 1 MG tablet     senna-docusate (SENOKOT-S;PERICOLACE) 8.6-50 MG per tablet     nicotine polacrilex (COMMIT) 2 MG lozenge     multivitamin, therapeutic with minerals (THERA-VIT-M) TABS     Aspirin 81 MG TBDP     fluticasone-salmeterol (ADVAIR) 500-50 MCG/DOSE diskus inhaler     tiotropium (SPIRIVA) 18 MCG inhalation capsule     gabapentin (NEURONTIN) 300 MG capsule     citalopram (CELEXA) 20 MG tablet     sodium chloride (OCEAN) 0.65 % nasal spray     cholecalciferol 1000 UNITS TABS     acetaminophen (TYLENOL) 500 MG tablet     loperamide (IMODIUM) 2 MG capsule     ALLERGIES  Allergies   Allergen Reactions     Sudafed [Pseudoephedrine] Other (See Comments)     Patient reports that he turns red.      Red Dye      Patient reports that he's  "only had issues with the red dye in Sudafed, he's tolerated other medications that contain red dye.        Review of Systems   Unable to perform ROS: Mental status change   Respiratory: Positive for chest tightness and shortness of breath.    Cardiovascular: Positive for chest pain.   Neurological: Positive for headaches.       Physical Exam   BP: 122/83 mmHg  Temp: 97.7  F (36.5  C)  Resp: 16  Height: 185.4 cm (6' 1\")  Weight: 92.534 kg (204 lb)  SpO2: 92 %  Physical Exam  Exam:  Constitutional: healthy, etoh and no distress  Head: Normocephalic. No masses, lesions, tenderness or abnormalities  Neck: Neck supple. No adenopathy. Thyroid symmetric, normal size,, Carotids without bruits.  ENT: ENT exam normal, no neck nodes or sinus tenderness  Cardiovascular: negative, PMI normal. No lifts, heaves, or thrills. RRR. No murmurs, clicks gallops or rub  Respiratory: negative, Percussion normal. Good diaphragmatic excursion. Lungs clear  Gastrointestinal: Abdomen soft, non-tender. BS normal. No masses, organomegaly  : Deferred  Musculoskeletal: extremities normal- no gross deformities noted, and normal muscle tone  Skin: no suspicious lesions or rashes  Neurologic:  Reflexes normal and symmetric. Sensation grossly WNL.  Psychiatric: mentation appears normal but intoxicated  Hematologic/Lymphatic/Immunologic: Normal cervical lymph nodes      ED Course     Procedures             EKG Interpretation:      Interpreted by Daquan David MD  Time reviewed: 2:13 PM  Symptoms at time of EKG: chest pain    Rhythm: atrial fibrillation - rapid ventricular response  Rate: 112 bpm  Axis: Normal  Ectopy: none  Conduction: right bundle branch block, left anterior fasciclar block and bifascicular, unchanged from prior  ST Segments/ T Waves: T wave abnormality, unchanged from prior  Q Waves: none  Comparison to prior: Unchanged from 9/11/2016    Clinical Impression: no acute changes         Labs Ordered and Resulted from Time of ED Arrival " Up to the Time of Departure from the ED   CBC WITH PLATELETS DIFFERENTIAL - Abnormal; Notable for the following:      (*)     All other components within normal limits   BASIC METABOLIC PANEL - Abnormal; Notable for the following:     Calcium 7.9 (*)     All other components within normal limits   ALCOHOL BREATH TEST POCT - Abnormal; Notable for the following:     Alcohol Breath Test 0.251 (*)     All other components within normal limits   TROPONIN I   NT PROBNP INPATIENT   PERIPHERAL IV CATHETER       Assessments & Plan (with Medical Decision Making)       I have reviewed the nursing notes.    mdm  This is a 61 male who was picked up at a parking lot smelling of alcohol.  Patient also complained of intermittent chest pain as well as headache.  Patient denies any trauma.  Patient denies any abdominal pain.  Patient states that he is also intermittently short of breath and lightheaded.  Patient is clearly intoxicated upon physical exam and is unable to follow directions of nursing staff to remain still.  Patient will require some sedation to aid with further examination and management of the patient.    After a period of observation in the ED patient discharged home in stable condition.  EKG as well as troponin do not show any evidence of any acute abnormalities at this time.  BNP also shows no evidence of elevation therefore no evidence of CHF at this time either.  Patient follow-up with primary care physician for chest pain evaluation as an outpatient.    I have reviewed the findings, diagnosis, plan and need for follow up with the patient.    Discharge Medication List as of 1/20/2017 10:19 PM          Final diagnoses:   Alcohol abuse with intoxication (H)   Chest pain, unspecified type     I, Hubert Patel, am serving as a trained medical scribe to document services personally performed by Daquan David MD, based on the provider's statements to me.      I, Daquan Dvaid MD, was physically present and have  reviewed and verified the accuracy of this note documented by Hubert Patel.     1/20/2017   [facility], [place], EMERGENCY DEPARTMENT      Daquan David MD  02/03/17 1214

## 2017-01-20 NOTE — ED AVS SNAPSHOT
[facility], [place], Emergency Department    [address] [place] [id number]    Phone:  [phone]                                       iRchard Montoya   MRN: [id number]    Department:  [facility], Emergency Department   Date of Visit:  1/20/2017           After Visit Summary Signature Page     I have received my discharge instructions, and my questions have been answered. I have discussed any challenges I see with this plan with the nurse or doctor.    ..........................................................................................................................................  Patient/Patient Representative Signature      ..........................................................................................................................................  Patient Representative Print Name and Relationship to Patient    ..................................................               ................................................  Date                                            Time    ..........................................................................................................................................  Reviewed by Signature/Title    ...................................................              ..............................................  Date                                                            Time

## 2017-01-20 NOTE — ED NOTES
Pt refusing to hold still for EKG, swinging at staff. Security present. Attempted x 2 to gain IV access, unable due to Pt moving.

## 2017-01-20 NOTE — ED NOTES
PT presents from [facility] to Ed with c/o chest pain for past couple days or so. PT appear intoxicated and was found by EMS to be drinking in [employer] parking lot. Pt given 324 aspirin enroute

## 2017-01-21 LAB — INTERPRETATION ECG - MUSE: NORMAL

## 2017-01-21 NOTE — DISCHARGE INSTRUCTIONS
"  Alcohol Abuse  Alcoholic drinks are harmful when you have too many of them. There is no set number of drinks that defines too much. Drinking that disrupts your life or your health is called alcohol abuse. Alcohol abuse can hurt your relationships with others. You may lose friends, a spouse, or even your job. You may be abusing alcohol if any of the following are true for you:    Duties at home or with  suffer because of drinking.    Duties at work or in school suffer because of drinking.    You have missed work or school because of drinking.    You use alcohol while driving or operating machinery.    You have legal problems such as arrests due to drinking.    You keep drinking even though it causes serious problems in your life.  Health effects  Alcohol abuse causes health problems. Sometimes this can happen after only drinking a  little.\" There is no set number of drinks or amount of alcohol that defines too much. The more you drink at one time, and the more often you drink determine both the short-term and long-term health effects. It affects all parts of your body and your health, including your:    Brain. Alcohol is a central nervous system depressant. It can damage parts of the brain that affect your balance, memory, thinking, and emotions. It can cause memory loss, blackouts, depression, agitation, sleep cycle changes, and seizures. These changes may or may not be reversible.    Heart and vascular system. Alcohol affects multiple areas. It can damage heart muscle causing cardiomyopathy, which is a weakening and stretching of the heart muscle. This can lead to trouble breathing, an irregular heartbeat, atrial fibrillation, leg swelling, and heart failure. Alcohol use makes the blood vessels stiffen causing hypertension (high blood pressure). All of these problems increase your risk of having heart attacks or strokes.    Liver. Alcohol causes fat to build up in the liver, affecting its normal " function. This increases the risk for hepatitis, leading to abdominal pain, appetite loss, jaundice, bleeding problems, liver fibrosis, and cirrhosis. This, in turn, can affect your ability to fight off infections, and can cause diabetes. The liver changes prevent it from removing toxins in your blood that can cause encephalopathy which may show with confusion, altered level of consciousness, personality changes, memory loss, seizures, coma, and death.    Pancreas. Alcohol can cause inflammation of the pancreas, or pancreatitis. This can cause abdominal pain, fever, and diabetes.    Immune system. Alcohol weakens your immune system in a number of ways. It suppresses your immune system making it harder to fight infections and colds. It also increases the chance of getting pneumonia and tuberculosis.    Cancer. Alcohol is a risk factor for developing cancer of the mouth, esophagus, pharynx, larynx, liver, and breast.    Sexual function. Alcohol can lead to sexual problems.  Home care  The following guidelines will help you deal with alcohol abuse:    Admit you have a problem with alcohol.    Ask for help from your health care provider and trusted family members or close friends.    Get help from people trained in dealing with alcohol abuse. This may be individual counseling or group therapy, or it may be a supervised alcohol treatment program.    Join a self-help group for alcohol abuse such as Alcoholics Anonymous (AA).    Avoid people who abuse alcohol or tempt you to drink.  Follow-up care  Follow up as advised by the doctor or our staff. Contact these groups to get help:    Alcoholics Anonymous (AA): Go to www.aa.org or check the phone book for meetings near you.    National Alcohol and Substance Abuse Information Center (NASAIC): 429.917.4833 www.addictioncareoptions.com    National [place] on Alcoholism and Drug Dependence (NCADD): 180-VOO-FGQD (357-6571) www.ncadd.org    Al-Anon: 595-7OY-APSA (439-2314)  www.al-anon.org  Call 911  Call 911 if any of these occur:    Trouble breathing or slow irregular breathing    Chest pain    Sudden weakness on one side of your body or sudden trouble speaking    Heavy bleeding or vomiting blood    Very drowsy or trouble awakening    Fainting or loss of consciousness    Rapid heart rate    Seizure  When to seek medical care  Get prompt medical attention if you have:    Confusion    Hallucinations (seeing, hearing, or feeling things that aren t there)    Pain in your upper abdomen that gets worse    Repeated vomiting or black or tarry stools    Severe shakiness    9848-9569 [employer]. [address] [place] [address]83. All rights reserved. This information is not intended as a substitute for professional medical care. Always follow your healthcare professional's instructions.

## 2017-02-04 ENCOUNTER — HOSPITAL ENCOUNTER (EMERGENCY)
Facility: CLINIC | Age: 62
Discharge: HOME | End: 2017-02-05
Attending: EMERGENCY MEDICINE | Admitting: EMERGENCY MEDICINE
Payer: MEDICARE

## 2017-02-04 DIAGNOSIS — R41.82 ALTERED MENTAL STATUS, UNSPECIFIED ALTERED MENTAL STATUS TYPE: ICD-10-CM

## 2017-02-04 DIAGNOSIS — F10.920 ALCOHOL INTOXICATION, UNCOMPLICATED (H): ICD-10-CM

## 2017-02-04 LAB — ALCOHOL BREATH TEST: NORMAL (ref 0–0.01)

## 2017-02-04 PROCEDURE — 82075 ASSAY OF BREATH ETHANOL: CPT | Performed by: EMERGENCY MEDICINE

## 2017-02-04 PROCEDURE — A9270 NON-COVERED ITEM OR SERVICE: HCPCS | Mod: GY | Performed by: EMERGENCY MEDICINE

## 2017-02-04 PROCEDURE — 25000132 ZZH RX MED GY IP 250 OP 250 PS 637: Mod: GY | Performed by: EMERGENCY MEDICINE

## 2017-02-04 PROCEDURE — 99207 ZZC APP CREDIT; MD BILLING SHARED VISIT: CPT | Mod: Z6 | Performed by: EMERGENCY MEDICINE

## 2017-02-04 PROCEDURE — 99285 EMERGENCY DEPT VISIT HI MDM: CPT | Mod: 25 | Performed by: EMERGENCY MEDICINE

## 2017-02-04 PROCEDURE — 99284 EMERGENCY DEPT VISIT MOD MDM: CPT | Mod: 25 | Performed by: EMERGENCY MEDICINE

## 2017-02-04 PROCEDURE — 93005 ELECTROCARDIOGRAM TRACING: CPT | Performed by: EMERGENCY MEDICINE

## 2017-02-04 RX ORDER — OLANZAPINE 5 MG/1
10 TABLET, ORALLY DISINTEGRATING ORAL ONCE
Status: COMPLETED | OUTPATIENT
Start: 2017-02-04 | End: 2017-02-04

## 2017-02-04 RX ADMIN — OLANZAPINE 10 MG: 5 TABLET, ORALLY DISINTEGRATING ORAL at 18:59

## 2017-02-04 NOTE — ED AVS SNAPSHOT
[facility], [place], Emergency Department    [address] [place] [place] [address]    Phone:  [phone]    Fax:  [phone]                                       Richard Montoya   MRN: [id number]    Department:  [facility], Emergency Department   Date of Visit:  2/4/2017           After Visit Summary Signature Page     I have received my discharge instructions, and my questions have been answered. I have discussed any challenges I see with this plan with the nurse or doctor.    ..........................................................................................................................................  Patient/Patient Representative Signature      ..........................................................................................................................................  Patient Representative Print Name and Relationship to Patient    ..................................................               ................................................  Date                                            Time    ..........................................................................................................................................  Reviewed by Signature/Title    ...................................................              ..............................................  Date                                                            Time

## 2017-02-04 NOTE — ED AVS SNAPSHOT
[facility], Emergency Department    [address]    [place] [address]    Phone:  [phone]    Fax:  [phone]                                       Richard Montoya   MRN: [id number]    Department:  [facility], Emergency Department   Date of Visit:  2/4/2017           Patient Information     Date Of Birth          1955        Your diagnoses for this visit were:     Altered mental status, unspecified altered mental status type     Alcohol intoxication, uncomplicated (H)        You were seen by Francisco Javier Veliz MD.      Follow-up Information     Schedule an appointment as soon as possible for a visit with Lui Carballo DO.    Specialty:  Family Practice    Contact information:    [facility]  [address]  [facility] [address] [phone]          Follow up with [facility], Emergency Department.    Specialty:  EMERGENCY MEDICINE    Why:  If symptoms worsen    Contact information:    [address] [place] [address] [phone]    Additional information:    The [facility] is located in the [facility]. lt is easily accessible from virtually any point in the [facility] area, via [address]        Discharge Instructions         Alcohol Abuse  Alcoholic drinks are harmful when you have too many of them. There is no set number of drinks that defines too much. Drinking that disrupts your life or your health is called alcohol abuse. Alcohol abuse can hurt your relationships with others. You may lose friends, a spouse, or even your job. You may be abusing alcohol if any of the following are true for you:    Duties at home or with  suffer because of drinking.    Duties at work or in school suffer because of drinking.    You have missed work or school because of drinking.    You use alcohol while driving or operating machinery.    You have legal problems such as arrests due to drinking.    You keep drinking even though it  "causes serious problems in your life.  Health effects  Alcohol abuse causes health problems. Sometimes this can happen after only drinking a  little.\" There is no set number of drinks or amount of alcohol that defines too much. The more you drink at one time, and the more often you drink determine both the short-term and long-term health effects. It affects all parts of your body and your health, including your:    Brain. Alcohol is a central nervous system depressant. It can damage parts of the brain that affect your balance, memory, thinking, and emotions. It can cause memory loss, blackouts, depression, agitation, sleep cycle changes, and seizures. These changes may or may not be reversible.    Heart and vascular system. Alcohol affects multiple areas. It can damage heart muscle causing cardiomyopathy, which is a weakening and stretching of the heart muscle. This can lead to trouble breathing, an irregular heartbeat, atrial fibrillation, leg swelling, and heart failure. Alcohol use makes the blood vessels stiffen causing hypertension (high blood pressure). All of these problems increase your risk of having heart attacks or strokes.    Liver. Alcohol causes fat to build up in the liver, affecting its normal function. This increases the risk for hepatitis, leading to abdominal pain, appetite loss, jaundice, bleeding problems, liver fibrosis, and cirrhosis. This, in turn, can affect your ability to fight off infections, and can cause diabetes. The liver changes prevent it from removing toxins in your blood that can cause encephalopathy which may show with confusion, altered level of consciousness, personality changes, memory loss, seizures, coma, and death.    Pancreas. Alcohol can cause inflammation of the pancreas, or pancreatitis. This can cause abdominal pain, fever, and diabetes.    Immune system. Alcohol weakens your immune system in a number of ways. It suppresses your immune system making it harder to fight " infections and colds. It also increases the chance of getting pneumonia and tuberculosis.    Cancer. Alcohol is a risk factor for developing cancer of the mouth, esophagus, pharynx, larynx, liver, and breast.    Sexual function. Alcohol can lead to sexual problems.  Home care  The following guidelines will help you deal with alcohol abuse:    Admit you have a problem with alcohol.    Ask for help from your health care provider and trusted family members or close friends.    Get help from people trained in dealing with alcohol abuse. This may be individual counseling or group therapy, or it may be a supervised alcohol treatment program.    Join a self-help group for alcohol abuse such as Alcoholics Anonymous (AA).    Avoid people who abuse alcohol or tempt you to drink.  Follow-up care  Follow up as advised by the doctor or our staff. Contact these groups to get help:    Alcoholics Anonymous (AA): Go to www.aa.org or check the phone book for meetings near you.    National Alcohol and Substance Abuse Information Center (NASAIC): 370.235.6226 www.addictioncareTykli.Stantum    National [place] on Alcoholism and Drug Dependence (NCADD): 352-BNF-HTOI (390-1380) www.ncadd.org    Al-Anon: 601-7XH-JLEZ (996-0497) www.al-anon.org  Call 911  Call 911 if any of these occur:    Trouble breathing or slow irregular breathing    Chest pain    Sudden weakness on one side of your body or sudden trouble speaking    Heavy bleeding or vomiting blood    Very drowsy or trouble awakening    Fainting or loss of consciousness    Rapid heart rate    Seizure  When to seek medical care  Get prompt medical attention if you have:    Confusion    Hallucinations (seeing, hearing, or feeling things that aren t there)    Pain in your upper abdomen that gets worse    Repeated vomiting or black or tarry stools    Severe shakiness    7390-1169 [employer]. [address] [place] [address], [place] [address]. All rights reserved. This information is  not intended as a substitute for professional medical care. Always follow your healthcare professional's instructions.          24 Hour Appointment Hotline       To make an appointment at any [facility], call [phone] ([phone]). If you don't have a family doctor or clinic, we will help you find one. [facility] are conveniently located to serve the needs of you and your family.             Review of your medicines      Our records show that you are taking the medicines listed below. If these are incorrect, please call your family doctor or clinic.        Dose / Directions Last dose taken    acetaminophen 500 MG tablet   Commonly known as:  TYLENOL   Dose:  500 mg        Take 1 tablet (500 mg) by mouth 4 times daily   Refills:  0        albuterol 108 (90 BASE) MCG/ACT Inhaler   Commonly known as:  PROAIR HFA/PROVENTIL HFA/VENTOLIN HFA   Dose:  2 puff   Quantity:  1 Inhaler        Inhale 2 puffs into the lungs every 4 hours as needed for shortness of breath / dyspnea   Refills:  0        * Aspirin 81 MG Tbdp   Dose:  81 mg   Quantity:  30 tablet        Take 81 mg by mouth daily   Refills:  1        * aspirin 81 MG tablet   Dose:  81 mg   Quantity:  3 tablet        Take 1 tablet (81 mg) by mouth daily   Refills:  0        cholecalciferol 1000 UNITS Tabs   Dose:  1000 Units   Quantity:  90 tablet        Take 1,000 Units by mouth daily   Refills:  1        * citalopram 20 MG tablet   Commonly known as:  celeXA   Dose:  20 mg   Quantity:  30 tablet        Take 1 tablet (20 mg) by mouth daily   Refills:  1        * citalopram 20 MG tablet   Commonly known as:  celeXA   Dose:  20 mg   Quantity:  3 tablet        Take 1 tablet (20 mg) by mouth daily   Refills:  0        fluticasone-salmeterol 500-50 MCG/DOSE diskus inhaler   Commonly known as:  ADVAIR   Dose:  1 puff   Quantity:  60 Inhaler        Inhale 1 puff into the lungs every 12 hours   Refills:  1        folic acid 1 MG tablet   Commonly known  as:  FOLVITE   Dose:  1 mg   Quantity:  30 tablet        Take 1 tablet (1 mg) by mouth daily   Refills:  0        furosemide 20 MG tablet   Commonly known as:  LASIX   Dose:  20 mg   Quantity:  30 tablet        Take 1 tablet (20 mg) by mouth daily   Refills:  3        gabapentin 300 MG capsule   Commonly known as:  NEURONTIN   Dose:  300 mg   Quantity:  90 capsule        Take 1 capsule (300 mg) by mouth 3 times daily   Refills:  1        * lisinopril 2.5 MG tablet   Commonly known as:  PRINIVIL/Zestril   Dose:  2.5 mg   Quantity:  30 tablet        Take 1 tablet (2.5 mg) by mouth daily   Refills:  3        * lisinopril 2.5 MG tablet   Commonly known as:  PRINIVIL/Zestril   Dose:  2.5 mg   Quantity:  3 tablet        Take 1 tablet (2.5 mg) by mouth daily   Refills:  0        loperamide 2 MG capsule   Commonly known as:  IMODIUM   Dose:  2 mg        Take 2 mg by mouth 4 times daily as needed for diarrhea   Refills:  0        * metoprolol 50 MG 24 hr tablet   Commonly known as:  TOPROL-XL   Dose:  50 mg   Quantity:  30 tablet        Take 1 tablet (50 mg) by mouth daily   Refills:  3        * metoprolol 50 MG 24 hr tablet   Commonly known as:  TOPROL-XL   Dose:  50 mg   Quantity:  3 tablet        Take 1 tablet (50 mg) by mouth daily   Refills:  0        multivitamin, therapeutic with minerals Tabs tablet   Dose:  1 tablet   Quantity:  30 each        Take 1 tablet by mouth daily   Refills:  0        nicotine polacrilex 2 MG lozenge   Commonly known as:  COMMIT   Dose:  4 mg   Quantity:  360 lozenge        Place 2 lozenges (4 mg) inside cheek every hour as needed for smoking cessation   Refills:  0        senna-docusate 8.6-50 MG per tablet   Commonly known as:  SENOKOT-S;PERICOLACE   Dose:  2 tablet   Quantity:  100 tablet        Take 2 tablets by mouth 2 times daily as needed for constipation   Refills:  0        sodium chloride 0.65 % nasal spray   Commonly known as:  OCEAN   Dose:  1 spray   Quantity:  1 Bottle         "Spray 1 spray into both nostrils daily as needed for congestion   Refills:  2        tiotropium 18 MCG capsule   Commonly known as:  SPIRIVA   Dose:  18 mcg   Quantity:  30 capsule        Inhale 1 capsule (18 mcg) into the lungs daily   Refills:  1        * Notice:  This list has 8 medication(s) that are the same as other medications prescribed for you. Read the directions carefully, and ask your doctor or other care provider to review them with you.            Procedures and tests performed during your visit     Alcohol breath test POCT    EKG 12 lead      Orders Needing Specimen Collection     Ordered          02/04/17 1749  Drug abuse screen 6 urine (tox) - STAT, [place]: STAT, Needs to be Collected     Scheduled Task Status   02/04/17 1750 Collect Drug abuse screen 6 urine (tox) Open   Order Class:  PCU Collect                  Pending Results     Date and Time Order Name Status Description    2/4/2017 1810 EKG 12 lead Preliminary             Pending Culture Results     No orders found for last 2 day(s).            Thank you for choosing [facility]       Thank you for choosing [facility] for your care. Our goal is always to provide you with excellent care. Hearing back from our patients is one way we can continue to improve our services. Please take a few minutes to complete the written survey that you may receive in the mail after you visit with us. Thank you!        [employer] Information     [employer] lets you send messages to your doctor, view your test results, renew your prescriptions, schedule appointments and more. To sign up, go to [email or web address] . Click on \"Log in\" on the left side of the screen, which will take you to the Welcome page. Then click on \"Sign up Now\" on the right side of the page.     You will be asked to enter the access code listed below, as well as some personal information. Please follow the directions to create your username and password.     Your access code is: [email or web address]  Expires: " 2017  8:21 AM     Your access code will  in 90 days. If you need help or a new code, please call your [place] clinic or [phone].        Care EveryWhere ID     This is your Care EveryWhere ID. This could be used by other organizations to access your [place] medical records  [id number]        After Visit Summary       This is your record. Keep this with you and show to your community pharmacist(s) and doctor(s) at your next visit.

## 2017-02-05 VITALS
TEMPERATURE: 98.2 F | SYSTOLIC BLOOD PRESSURE: 122 MMHG | OXYGEN SATURATION: 97 % | DIASTOLIC BLOOD PRESSURE: 89 MMHG | RESPIRATION RATE: 15 BRPM

## 2017-02-05 PROCEDURE — 25000125 ZZHC RX 250: Performed by: EMERGENCY MEDICINE

## 2017-02-05 RX ORDER — ONDANSETRON 4 MG/1
4 TABLET, ORALLY DISINTEGRATING ORAL ONCE
Status: COMPLETED | OUTPATIENT
Start: 2017-02-05 | End: 2017-02-05

## 2017-02-05 RX ADMIN — ONDANSETRON 4 MG: 4 TABLET, ORALLY DISINTEGRATING ORAL at 01:07

## 2017-02-05 NOTE — ED PROVIDER NOTES
History     Chief Complaint   Patient presents with     Alcohol Intoxication     per EMS report someone called and stated that pt was uanble to take care of self due to alcohol intoxication. When EMS arrived pt was laying on the floor, very intoxicated     HPI  Richard Montoya is a 61 year old male who was brought in by prehospital providers for AMS given that he was unable to care for himself. THere was no history of trauma or seizure, and there is a suspicion for EtOH at the etiology of his AMS. No further history obtained due to AMS.     I have reviewed the Medications, Allergies, Past Medical and Surgical History, and Social History in the Epic system.    Review of Systems  ROS unable to be obtained due ot AMS    Physical Exam   BP: (!) 121/99 mmHg  Heart Rate: 92  Temp: 98.2  F (36.8  C)  Resp: 15  Weight:  (BRADY, pt very intoxicated)  SpO2: 94 %  Physical Exam  Gen: NAD, intoxicated, smells of etoh  HEENT: NCAT, PERRL, EOMI, MMM  Neck: trachea midline, supple with FROM  Cardio: normal rate regular rhythm, no R/M/G, normally perfused and warm  Pulm: steady, non-labored respirations, normal WOB, CTAB, no w/r/r  Abd: soft nt/nd, no organomegaly, no CVA tenderness  Ext: normal peripheral pulses, no edema, neurovascularly intact distally.  Skin: no rashes or signs of trauma  Neuro: no focal deficits, poor coordination    ED Course     Procedures             EKG Interpretation:      Interpreted by Francisco Javier Veliz  Symptoms at time of EKG: none   Rhythm: afib   Rate: normal  Axis: normal  Ectopy: none  Conduction: RBBB  ST Segments/ T Waves: No ST-T wave changes  Q Waves: none  Comparison to prior: Unchanged    Clinical Impression: afib with rbbb, lvh, unchanged from prior             Labs Ordered and Resulted from Time of ED Arrival Up to the Time of Departure from the ED   ALCOHOL BREATH TEST POCT - Normal   DRUG ABUSE SCREEN 6 CHEM DEP URINE ([facility])       Assessments & Plan (with Medical Decision Making)   This is  a 61 year old male who presents to the emergency department with altered mental status. I have a low suspicion for intracranial hemorrhage, traumatic, infectious, or toxic metabolic derangements as the etiology for his symptoms. On arrival his breathalyzer was elevated to 0.263 and likely etiology for his altered mental status is acute alcohol intoxication. The patient was increasingly becoming agitated and at one point did have one episode of emesis, however, he was protecting his airway. For his nausea and increasing agitation I did give him 10 mg of oral Olanzapine which did resolve his nausea and help with his agitation. The patient was able to rest in the emergency department and he was observed over a period of four hours with improvement in his mental status. He was clearing appropriately but not ready for discharge, so he was signed out with recs to continue neuro monitoring and re-evaluate for continued clearing until patient is clinically sober, tolerating PO, and ambulating safely, and is ready for discharge. If not, could broaden work up to include labs or imaging. Patient signed out at this time in good condition.     I have reviewed the nursing notes.    I have reviewed the findings, diagnosis, plan and need for follow up with the patient.  This part of the document was transcribed by Katie Mayes Medical Scribe.   New Prescriptions    No medications on file       Final diagnoses:   Altered mental status, unspecified altered mental status type   Alcohol intoxication, uncomplicated (H)       2/4/2017   [facility], [place], EMERGENCY DEPARTMENT      Francisco Javier Veliz MD  02/05/17 0136

## 2017-02-05 NOTE — DISCHARGE INSTRUCTIONS
"  Alcohol Abuse  Alcoholic drinks are harmful when you have too many of them. There is no set number of drinks that defines too much. Drinking that disrupts your life or your health is called alcohol abuse. Alcohol abuse can hurt your relationships with others. You may lose friends, a spouse, or even your job. You may be abusing alcohol if any of the following are true for you:    Duties at home or with  suffer because of drinking.    Duties at work or in school suffer because of drinking.    You have missed work or school because of drinking.    You use alcohol while driving or operating machinery.    You have legal problems such as arrests due to drinking.    You keep drinking even though it causes serious problems in your life.  Health effects  Alcohol abuse causes health problems. Sometimes this can happen after only drinking a  little.\" There is no set number of drinks or amount of alcohol that defines too much. The more you drink at one time, and the more often you drink determine both the short-term and long-term health effects. It affects all parts of your body and your health, including your:    Brain. Alcohol is a central nervous system depressant. It can damage parts of the brain that affect your balance, memory, thinking, and emotions. It can cause memory loss, blackouts, depression, agitation, sleep cycle changes, and seizures. These changes may or may not be reversible.    Heart and vascular system. Alcohol affects multiple areas. It can damage heart muscle causing cardiomyopathy, which is a weakening and stretching of the heart muscle. This can lead to trouble breathing, an irregular heartbeat, atrial fibrillation, leg swelling, and heart failure. Alcohol use makes the blood vessels stiffen causing hypertension (high blood pressure). All of these problems increase your risk of having heart attacks or strokes.    Liver. Alcohol causes fat to build up in the liver, affecting its normal " function. This increases the risk for hepatitis, leading to abdominal pain, appetite loss, jaundice, bleeding problems, liver fibrosis, and cirrhosis. This, in turn, can affect your ability to fight off infections, and can cause diabetes. The liver changes prevent it from removing toxins in your blood that can cause encephalopathy which may show with confusion, altered level of consciousness, personality changes, memory loss, seizures, coma, and death.    Pancreas. Alcohol can cause inflammation of the pancreas, or pancreatitis. This can cause abdominal pain, fever, and diabetes.    Immune system. Alcohol weakens your immune system in a number of ways. It suppresses your immune system making it harder to fight infections and colds. It also increases the chance of getting pneumonia and tuberculosis.    Cancer. Alcohol is a risk factor for developing cancer of the mouth, esophagus, pharynx, larynx, liver, and breast.    Sexual function. Alcohol can lead to sexual problems.  Home care  The following guidelines will help you deal with alcohol abuse:    Admit you have a problem with alcohol.    Ask for help from your health care provider and trusted family members or close friends.    Get help from people trained in dealing with alcohol abuse. This may be individual counseling or group therapy, or it may be a supervised alcohol treatment program.    Join a self-help group for alcohol abuse such as Alcoholics Anonymous (AA).    Avoid people who abuse alcohol or tempt you to drink.  Follow-up care  Follow up as advised by the doctor or our staff. Contact these groups to get help:    Alcoholics Anonymous (AA): Go to www.aa.org or check the phone book for meetings near you.    National Alcohol and Substance Abuse Information Center (NASAIC): 459.772.1558 www.addictioncareoptions.com    National [place] on Alcoholism and Drug Dependence (NCADD): 238-EBJ-HZYJ (523-8032) www.ncadd.org    Al-Anon: 763-9AO-MNHQ (465-4732)  www.al-anon.org  Call 911  Call 911 if any of these occur:    Trouble breathing or slow irregular breathing    Chest pain    Sudden weakness on one side of your body or sudden trouble speaking    Heavy bleeding or vomiting blood    Very drowsy or trouble awakening    Fainting or loss of consciousness    Rapid heart rate    Seizure  When to seek medical care  Get prompt medical attention if you have:    Confusion    Hallucinations (seeing, hearing, or feeling things that aren t there)    Pain in your upper abdomen that gets worse    Repeated vomiting or black or tarry stools    Severe shakiness    6769-0657 [employer]. [address] [place] [address] 14829. All rights reserved. This information is not intended as a substitute for professional medical care. Always follow your healthcare professional's instructions.

## 2017-02-05 NOTE — ED NOTES
"Pt becoming agitated. Swearing at staff. Pulled off heart monitor. Asked pt to sit back in bed and pt stating \"Fuck you!\" Pt demanding \"something to eat now\". Gave pt sandwich.   "

## 2017-02-05 NOTE — ED PROVIDER NOTES
The pt was accepted at shift change sign out with plan to monitor in the ED until sober, then d/c home. The pt rested throughout the night, then was d/c in the am, clinically sober, per plans.    Brittany Dennis MD  02/05/17 0619

## 2017-02-06 LAB — INTERPRETATION ECG - MUSE: NORMAL

## 2017-08-21 PROCEDURE — HZ2ZZZZ DETOXIFICATION SERVICES FOR SUBSTANCE ABUSE TREATMENT: ICD-10-PCS | Performed by: FAMILY MEDICINE

## 2017-09-20 ENCOUNTER — HOSPITAL ENCOUNTER (INPATIENT)
Facility: CLINIC | Age: 62
LOS: 1 days | Discharge: PSYCHIATRIC OR SUBSTANCE USE FACILITY | DRG: 896 | End: 2017-09-22
Attending: FAMILY MEDICINE | Admitting: INTERNAL MEDICINE
Payer: MEDICARE

## 2017-09-20 ENCOUNTER — APPOINTMENT (OUTPATIENT)
Dept: GENERAL RADIOLOGY | Facility: CLINIC | Age: 62
DRG: 896 | End: 2017-09-20
Attending: FAMILY MEDICINE
Payer: MEDICARE

## 2017-09-20 DIAGNOSIS — R09.02 HYPOXIA: ICD-10-CM

## 2017-09-20 DIAGNOSIS — J20.9 ACUTE BRONCHITIS, UNSPECIFIED ORGANISM: Primary | ICD-10-CM

## 2017-09-20 DIAGNOSIS — J44.1 COPD EXACERBATION (H): ICD-10-CM

## 2017-09-20 DIAGNOSIS — F10.129 ALCOHOL ABUSE WITH INTOXICATION (H): ICD-10-CM

## 2017-09-20 DIAGNOSIS — I48.0 PAROXYSMAL ATRIAL FIBRILLATION (H): ICD-10-CM

## 2017-09-20 DIAGNOSIS — F10.939 ALCOHOL WITHDRAWAL, WITH UNSPECIFIED COMPLICATION (H): ICD-10-CM

## 2017-09-20 LAB
ALBUMIN SERPL-MCNC: 3.5 G/DL (ref 3.4–5)
ALCOHOL BREATH TEST: 0.18 (ref 0–0.01)
ALP SERPL-CCNC: 75 U/L (ref 40–150)
ALT SERPL W P-5'-P-CCNC: 100 U/L (ref 0–70)
ANION GAP SERPL CALCULATED.3IONS-SCNC: 10 MMOL/L (ref 3–14)
APTT PPP: 24 SEC (ref 22–37)
AST SERPL W P-5'-P-CCNC: 104 U/L (ref 0–45)
BASOPHILS # BLD AUTO: 0.1 10E9/L (ref 0–0.2)
BASOPHILS NFR BLD AUTO: 0.6 %
BILIRUB SERPL-MCNC: 0.3 MG/DL (ref 0.2–1.3)
BUN SERPL-MCNC: 23 MG/DL (ref 7–30)
CALCIUM SERPL-MCNC: 8.3 MG/DL (ref 8.5–10.1)
CHLORIDE SERPL-SCNC: 110 MMOL/L (ref 94–109)
CO2 SERPL-SCNC: 23 MMOL/L (ref 20–32)
CREAT SERPL-MCNC: 1.07 MG/DL (ref 0.66–1.25)
DIFFERENTIAL METHOD BLD: ABNORMAL
EOSINOPHIL # BLD AUTO: 0.4 10E9/L (ref 0–0.7)
EOSINOPHIL NFR BLD AUTO: 3.3 %
ERYTHROCYTE [DISTWIDTH] IN BLOOD BY AUTOMATED COUNT: 15.3 % (ref 10–15)
GFR SERPL CREATININE-BSD FRML MDRD: 70 ML/MIN/1.7M2
GLUCOSE SERPL-MCNC: 86 MG/DL (ref 70–99)
HCT VFR BLD AUTO: 42.8 % (ref 40–53)
HGB BLD-MCNC: 13.9 G/DL (ref 13.3–17.7)
IMM GRANULOCYTES # BLD: 0 10E9/L (ref 0–0.4)
IMM GRANULOCYTES NFR BLD: 0.3 %
INR PPP: 0.9 (ref 0.86–1.14)
INTERPRETATION ECG - MUSE: NORMAL
LIPASE SERPL-CCNC: 105 U/L (ref 73–393)
LYMPHOCYTES # BLD AUTO: 2 10E9/L (ref 0.8–5.3)
LYMPHOCYTES NFR BLD AUTO: 18.2 %
MAGNESIUM SERPL-MCNC: 2.4 MG/DL (ref 1.6–2.3)
MCH RBC QN AUTO: 29.1 PG (ref 26.5–33)
MCHC RBC AUTO-ENTMCNC: 32.5 G/DL (ref 31.5–36.5)
MCV RBC AUTO: 90 FL (ref 78–100)
MONOCYTES # BLD AUTO: 0.7 10E9/L (ref 0–1.3)
MONOCYTES NFR BLD AUTO: 6.5 %
NEUTROPHILS # BLD AUTO: 7.9 10E9/L (ref 1.6–8.3)
NEUTROPHILS NFR BLD AUTO: 71.1 %
NRBC # BLD AUTO: 0 10*3/UL
NRBC BLD AUTO-RTO: 0 /100
NT-PROBNP SERPL-MCNC: 390 PG/ML (ref 0–900)
PLATELET # BLD AUTO: 188 10E9/L (ref 150–450)
POTASSIUM SERPL-SCNC: 4.1 MMOL/L (ref 3.4–5.3)
PROT SERPL-MCNC: 6.8 G/DL (ref 6.8–8.8)
RBC # BLD AUTO: 4.78 10E12/L (ref 4.4–5.9)
SODIUM SERPL-SCNC: 143 MMOL/L (ref 133–144)
TROPONIN I SERPL-MCNC: <0.015 UG/L (ref 0–0.04)
WBC # BLD AUTO: 11.1 10E9/L (ref 4–11)

## 2017-09-20 PROCEDURE — 93005 ELECTROCARDIOGRAM TRACING: CPT | Performed by: FAMILY MEDICINE

## 2017-09-20 PROCEDURE — 85025 COMPLETE CBC W/AUTO DIFF WBC: CPT | Performed by: FAMILY MEDICINE

## 2017-09-20 PROCEDURE — 96374 THER/PROPH/DIAG INJ IV PUSH: CPT | Performed by: FAMILY MEDICINE

## 2017-09-20 PROCEDURE — 84484 ASSAY OF TROPONIN QUANT: CPT | Performed by: FAMILY MEDICINE

## 2017-09-20 PROCEDURE — 99285 EMERGENCY DEPT VISIT HI MDM: CPT | Mod: 25 | Performed by: FAMILY MEDICINE

## 2017-09-20 PROCEDURE — 93010 ELECTROCARDIOGRAM REPORT: CPT | Mod: Z6 | Performed by: FAMILY MEDICINE

## 2017-09-20 PROCEDURE — 96361 HYDRATE IV INFUSION ADD-ON: CPT | Performed by: FAMILY MEDICINE

## 2017-09-20 PROCEDURE — 83880 ASSAY OF NATRIURETIC PEPTIDE: CPT | Performed by: FAMILY MEDICINE

## 2017-09-20 PROCEDURE — 25000128 H RX IP 250 OP 636: Performed by: FAMILY MEDICINE

## 2017-09-20 PROCEDURE — 82075 ASSAY OF BREATH ETHANOL: CPT | Performed by: FAMILY MEDICINE

## 2017-09-20 PROCEDURE — 83690 ASSAY OF LIPASE: CPT | Performed by: FAMILY MEDICINE

## 2017-09-20 PROCEDURE — 94640 AIRWAY INHALATION TREATMENT: CPT | Performed by: FAMILY MEDICINE

## 2017-09-20 PROCEDURE — 25000132 ZZH RX MED GY IP 250 OP 250 PS 637: Mod: GY | Performed by: FAMILY MEDICINE

## 2017-09-20 PROCEDURE — 71010 XR CHEST PORT 1 VW: CPT

## 2017-09-20 PROCEDURE — 96375 TX/PRO/DX INJ NEW DRUG ADDON: CPT | Performed by: FAMILY MEDICINE

## 2017-09-20 PROCEDURE — A9270 NON-COVERED ITEM OR SERVICE: HCPCS | Mod: GY | Performed by: FAMILY MEDICINE

## 2017-09-20 PROCEDURE — 85610 PROTHROMBIN TIME: CPT | Performed by: FAMILY MEDICINE

## 2017-09-20 PROCEDURE — 80053 COMPREHEN METABOLIC PANEL: CPT | Performed by: FAMILY MEDICINE

## 2017-09-20 PROCEDURE — 83735 ASSAY OF MAGNESIUM: CPT | Performed by: FAMILY MEDICINE

## 2017-09-20 PROCEDURE — 25000125 ZZHC RX 250: Performed by: FAMILY MEDICINE

## 2017-09-20 PROCEDURE — 85730 THROMBOPLASTIN TIME PARTIAL: CPT | Performed by: FAMILY MEDICINE

## 2017-09-20 RX ORDER — ONDANSETRON 2 MG/ML
4 INJECTION INTRAMUSCULAR; INTRAVENOUS ONCE
Status: COMPLETED | OUTPATIENT
Start: 2017-09-20 | End: 2017-09-21

## 2017-09-20 RX ORDER — LIDOCAINE 40 MG/G
CREAM TOPICAL
Status: DISCONTINUED | OUTPATIENT
Start: 2017-09-20 | End: 2017-09-21

## 2017-09-20 RX ORDER — ASPIRIN 81 MG/1
162 TABLET, CHEWABLE ORAL ONCE
Status: COMPLETED | OUTPATIENT
Start: 2017-09-20 | End: 2017-09-20

## 2017-09-20 RX ORDER — SODIUM CHLORIDE 9 MG/ML
INJECTION, SOLUTION INTRAVENOUS
Status: DISCONTINUED
Start: 2017-09-20 | End: 2017-09-21 | Stop reason: HOSPADM

## 2017-09-20 RX ORDER — LEVALBUTEROL INHALATION SOLUTION 0.63 MG/3ML
0.63 SOLUTION RESPIRATORY (INHALATION) ONCE
Status: COMPLETED | OUTPATIENT
Start: 2017-09-20 | End: 2017-09-20

## 2017-09-20 RX ADMIN — SODIUM CHLORIDE 1000 ML: 9 INJECTION, SOLUTION INTRAVENOUS at 19:22

## 2017-09-20 RX ADMIN — LEVALBUTEROL HYDROCHLORIDE 0.63 MG: 0.63 SOLUTION RESPIRATORY (INHALATION) at 20:17

## 2017-09-20 RX ADMIN — ASPIRIN 81 MG CHEWABLE TABLET 162 MG: 81 TABLET CHEWABLE at 19:23

## 2017-09-20 NOTE — LETTER
Health Information Management Services               Recipient:  ATTN: ADMISSIONS        Sender: ELENA DELGADO/KARY, T: [phone]          Date: September 21, 2017  Patient Name:  Richard Montoya  Routing Message:    PLEASE REVIEW FOR PLACEMENT, DC ANTICIPATED FOR SATURDAY        The documents accompanying this notice contain confidential information belonging to the sender.  This information is intended only for the use of the individual or entity named above.  The authorized recipient of this information is prohibited from disclosing this information to any other party and is required to destroy the information after its stated need has been fulfilled, unless otherwise required by state law.      If you are not the intended recipient, you are hereby notified that any disclosure, copying, distribution or action taken in reliance on the contents of these documents is strictly prohibited. If you have received this document in error, please notify [place] immediately at [phone].  You may return the document via fax ([phone]) or return mail  (Health Information Management, , [address] [place] [address]).

## 2017-09-20 NOTE — ED NOTES
Patient was living in a sober living facility. Patient was intoxicated today and brought to ED by the [facility] , Sunny. Patient cannot return to the sober living facility and is essentially homeless. Patient lived at [facility] for 5 months. Patient left on a one night pass, but was gone for over 2 days. Patient returned to [place] highly intoxicated.     *Time accidentally changed on note by Rani Donovan RN

## 2017-09-20 NOTE — ED NOTES
Drinking 1/2 pint of Vodka and some Kyra today. Has a raspy voice normally, however patient is slurring his words in triage. C/O SOB starting last night. Patient's friend Sunny brought patient in due to severe intoxication today. Recent relapse after 7 months of sobriety.

## 2017-09-20 NOTE — LETTER
Health Information Management Services               Recipient:  JF        Sender:  ELENA [phone]        Date: September 22, 2017  Patient Name:  Richard Montoya  Routing Message:    Please review for admission        The documents accompanying this notice contain confidential information belonging to the sender.  This information is intended only for the use of the individual or entity named above.  The authorized recipient of this information is prohibited from disclosing this information to any other party and is required to destroy the information after its stated need has been fulfilled, unless otherwise required by state law.      If you are not the intended recipient, you are hereby notified that any disclosure, copying, distribution or action taken in reliance on the contents of these documents is strictly prohibited. If you have received this document in error, please notify [place] immediately at [phone].  You may return the document via fax ([phone]) or return mail  (Health Information Management, , [address] [place] [address]).

## 2017-09-21 ENCOUNTER — APPOINTMENT (OUTPATIENT)
Dept: CARDIOLOGY | Facility: CLINIC | Age: 62
DRG: 896 | End: 2017-09-21
Attending: INTERNAL MEDICINE
Payer: MEDICARE

## 2017-09-21 PROBLEM — J44.1 COPD EXACERBATION (H): Status: ACTIVE | Noted: 2017-09-21

## 2017-09-21 PROBLEM — J44.1 COPD EXACERBATION (H): Status: RESOLVED | Noted: 2017-09-21 | Resolved: 2017-09-21

## 2017-09-21 LAB
PROCALCITONIN SERPL-MCNC: <0.05 NG/ML
TROPONIN I SERPL-MCNC: 0.02 UG/L (ref 0–0.04)

## 2017-09-21 PROCEDURE — 40000264 ECHO COMPLETE WITH OPTISON

## 2017-09-21 PROCEDURE — 25000132 ZZH RX MED GY IP 250 OP 250 PS 637: Mod: GY | Performed by: STUDENT IN AN ORGANIZED HEALTH CARE EDUCATION/TRAINING PROGRAM

## 2017-09-21 PROCEDURE — 25000125 ZZHC RX 250: Performed by: INTERNAL MEDICINE

## 2017-09-21 PROCEDURE — 25000128 H RX IP 250 OP 636: Performed by: FAMILY MEDICINE

## 2017-09-21 PROCEDURE — 25000132 ZZH RX MED GY IP 250 OP 250 PS 637: Mod: GY | Performed by: FAMILY MEDICINE

## 2017-09-21 PROCEDURE — 25500064 ZZH RX 255 OP 636: Performed by: PEDIATRICS

## 2017-09-21 PROCEDURE — 99223 1ST HOSP IP/OBS HIGH 75: CPT | Mod: AI | Performed by: INTERNAL MEDICINE

## 2017-09-21 PROCEDURE — 94640 AIRWAY INHALATION TREATMENT: CPT | Mod: 76

## 2017-09-21 PROCEDURE — 40000275 ZZH STATISTIC RCP TIME EA 10 MIN

## 2017-09-21 PROCEDURE — A9270 NON-COVERED ITEM OR SERVICE: HCPCS | Mod: GY | Performed by: FAMILY MEDICINE

## 2017-09-21 PROCEDURE — 99212 OFFICE O/P EST SF 10 MIN: CPT

## 2017-09-21 PROCEDURE — 25000132 ZZH RX MED GY IP 250 OP 250 PS 637: Mod: GY | Performed by: INTERNAL MEDICINE

## 2017-09-21 PROCEDURE — 25000125 ZZHC RX 250: Performed by: FAMILY MEDICINE

## 2017-09-21 PROCEDURE — A9270 NON-COVERED ITEM OR SERVICE: HCPCS | Mod: GY | Performed by: STUDENT IN AN ORGANIZED HEALTH CARE EDUCATION/TRAINING PROGRAM

## 2017-09-21 PROCEDURE — 12000008 ZZH R&B INTERMEDIATE UMMC

## 2017-09-21 PROCEDURE — 84145 PROCALCITONIN (PCT): CPT | Performed by: INTERNAL MEDICINE

## 2017-09-21 PROCEDURE — 25000125 ZZHC RX 250: Performed by: STUDENT IN AN ORGANIZED HEALTH CARE EDUCATION/TRAINING PROGRAM

## 2017-09-21 PROCEDURE — 94640 AIRWAY INHALATION TREATMENT: CPT

## 2017-09-21 PROCEDURE — A9270 NON-COVERED ITEM OR SERVICE: HCPCS | Mod: GY | Performed by: INTERNAL MEDICINE

## 2017-09-21 PROCEDURE — 40000962 ZZH STATISTIC CHRONIC DISEASE SPECIALIST RT CONSULT

## 2017-09-21 PROCEDURE — 36415 COLL VENOUS BLD VENIPUNCTURE: CPT | Performed by: INTERNAL MEDICINE

## 2017-09-21 PROCEDURE — 93306 TTE W/DOPPLER COMPLETE: CPT | Mod: 26 | Performed by: INTERNAL MEDICINE

## 2017-09-21 RX ORDER — ASPIRIN 81 MG/1
81 TABLET ORAL DAILY
Status: DISCONTINUED | OUTPATIENT
Start: 2017-09-21 | End: 2017-09-22 | Stop reason: HOSPADM

## 2017-09-21 RX ORDER — FOLIC ACID 1 MG/1
1 TABLET ORAL DAILY
Status: DISCONTINUED | OUTPATIENT
Start: 2017-09-21 | End: 2017-09-22 | Stop reason: HOSPADM

## 2017-09-21 RX ORDER — POLYETHYLENE GLYCOL 3350 17 G
4 POWDER IN PACKET (EA) ORAL
Status: DISCONTINUED | OUTPATIENT
Start: 2017-09-21 | End: 2017-09-22 | Stop reason: HOSPADM

## 2017-09-21 RX ORDER — NALOXONE HYDROCHLORIDE 0.4 MG/ML
.1-.4 INJECTION, SOLUTION INTRAMUSCULAR; INTRAVENOUS; SUBCUTANEOUS
Status: DISCONTINUED | OUTPATIENT
Start: 2017-09-21 | End: 2017-09-21

## 2017-09-21 RX ORDER — IPRATROPIUM BROMIDE AND ALBUTEROL SULFATE 2.5; .5 MG/3ML; MG/3ML
3 SOLUTION RESPIRATORY (INHALATION)
Status: DISCONTINUED | OUTPATIENT
Start: 2017-09-21 | End: 2017-09-21

## 2017-09-21 RX ORDER — NALOXONE HYDROCHLORIDE 0.4 MG/ML
.1-.4 INJECTION, SOLUTION INTRAMUSCULAR; INTRAVENOUS; SUBCUTANEOUS
Status: DISCONTINUED | OUTPATIENT
Start: 2017-09-21 | End: 2017-09-22 | Stop reason: HOSPADM

## 2017-09-21 RX ORDER — PREDNISONE 20 MG/1
40 TABLET ORAL DAILY
Status: DISCONTINUED | OUTPATIENT
Start: 2017-09-21 | End: 2017-09-22 | Stop reason: HOSPADM

## 2017-09-21 RX ORDER — IBUPROFEN 200 MG
400 TABLET ORAL ONCE
Status: COMPLETED | OUTPATIENT
Start: 2017-09-21 | End: 2017-09-21

## 2017-09-21 RX ORDER — GABAPENTIN 300 MG/1
300 CAPSULE ORAL 3 TIMES DAILY
Status: DISCONTINUED | OUTPATIENT
Start: 2017-09-21 | End: 2017-09-22 | Stop reason: HOSPADM

## 2017-09-21 RX ORDER — ALBUTEROL SULFATE 0.83 MG/ML
3 SOLUTION RESPIRATORY (INHALATION)
Status: DISCONTINUED | OUTPATIENT
Start: 2017-09-21 | End: 2017-09-22

## 2017-09-21 RX ORDER — LORAZEPAM 1 MG/1
1 TABLET ORAL ONCE
Status: COMPLETED | OUTPATIENT
Start: 2017-09-21 | End: 2017-09-21

## 2017-09-21 RX ORDER — LIDOCAINE 40 MG/G
CREAM TOPICAL
Status: DISCONTINUED | OUTPATIENT
Start: 2017-09-21 | End: 2017-09-22 | Stop reason: HOSPADM

## 2017-09-21 RX ORDER — ALBUTEROL SULFATE 90 UG/1
2 AEROSOL, METERED RESPIRATORY (INHALATION) 4 TIMES DAILY PRN
Status: DISCONTINUED | OUTPATIENT
Start: 2017-09-21 | End: 2017-09-22 | Stop reason: HOSPADM

## 2017-09-21 RX ORDER — METHYLPREDNISOLONE SODIUM SUCCINATE 125 MG/2ML
125 INJECTION, POWDER, LYOPHILIZED, FOR SOLUTION INTRAMUSCULAR; INTRAVENOUS ONCE
Status: COMPLETED | OUTPATIENT
Start: 2017-09-21 | End: 2017-09-21

## 2017-09-21 RX ORDER — IPRATROPIUM BROMIDE AND ALBUTEROL SULFATE 2.5; .5 MG/3ML; MG/3ML
3 SOLUTION RESPIRATORY (INHALATION) EVERY 4 HOURS
Status: DISCONTINUED | OUTPATIENT
Start: 2017-09-21 | End: 2017-09-22

## 2017-09-21 RX ORDER — LORAZEPAM 1 MG/1
1-4 TABLET ORAL EVERY 30 MIN PRN
Status: DISCONTINUED | OUTPATIENT
Start: 2017-09-21 | End: 2017-09-22 | Stop reason: HOSPADM

## 2017-09-21 RX ADMIN — ASPIRIN 81 MG: 81 TABLET ORAL at 08:47

## 2017-09-21 RX ADMIN — FLUTICASONE FUROATE AND VILANTEROL TRIFENATATE 1 PUFF: 200; 25 POWDER RESPIRATORY (INHALATION) at 13:34

## 2017-09-21 RX ADMIN — UMECLIDINIUM 1 PUFF: 62.5 AEROSOL, POWDER ORAL at 13:33

## 2017-09-21 RX ADMIN — IPRATROPIUM BROMIDE AND ALBUTEROL SULFATE 3 ML: .5; 3 SOLUTION RESPIRATORY (INHALATION) at 00:43

## 2017-09-21 RX ADMIN — IPRATROPIUM BROMIDE AND ALBUTEROL SULFATE 3 ML: .5; 3 SOLUTION RESPIRATORY (INHALATION) at 12:34

## 2017-09-21 RX ADMIN — GABAPENTIN 300 MG: 300 CAPSULE ORAL at 08:47

## 2017-09-21 RX ADMIN — IBUPROFEN 400 MG: 200 TABLET, FILM COATED ORAL at 13:35

## 2017-09-21 RX ADMIN — METHYLPREDNISOLONE SODIUM SUCCINATE 125 MG: 125 INJECTION, POWDER, FOR SOLUTION INTRAMUSCULAR; INTRAVENOUS at 01:23

## 2017-09-21 RX ADMIN — GABAPENTIN 300 MG: 300 CAPSULE ORAL at 16:12

## 2017-09-21 RX ADMIN — IPRATROPIUM BROMIDE AND ALBUTEROL SULFATE 3 ML: .5; 3 SOLUTION RESPIRATORY (INHALATION) at 16:28

## 2017-09-21 RX ADMIN — LORAZEPAM 1 MG: 1 TABLET ORAL at 01:56

## 2017-09-21 RX ADMIN — IPRATROPIUM BROMIDE AND ALBUTEROL SULFATE 3 ML: .5; 3 SOLUTION RESPIRATORY (INHALATION) at 09:20

## 2017-09-21 RX ADMIN — FOLIC ACID 1 MG: 1 TABLET ORAL at 08:47

## 2017-09-21 RX ADMIN — PREDNISONE 40 MG: 20 TABLET ORAL at 13:34

## 2017-09-21 RX ADMIN — GABAPENTIN 300 MG: 300 CAPSULE ORAL at 20:06

## 2017-09-21 RX ADMIN — IPRATROPIUM BROMIDE AND ALBUTEROL SULFATE 3 ML: .5; 3 SOLUTION RESPIRATORY (INHALATION) at 20:11

## 2017-09-21 RX ADMIN — ONDANSETRON 4 MG: 2 INJECTION INTRAMUSCULAR; INTRAVENOUS at 00:07

## 2017-09-21 RX ADMIN — LORAZEPAM 1 MG: 1 TABLET ORAL at 05:22

## 2017-09-21 RX ADMIN — HUMAN ALBUMIN MICROSPHERES AND PERFLUTREN 6 ML: 10; .22 INJECTION, SOLUTION INTRAVENOUS at 12:55

## 2017-09-21 ASSESSMENT — ENCOUNTER SYMPTOMS
VOICE CHANGE: 1
SHORTNESS OF BREATH: 1
DIFFICULTY URINATING: 0
WEAKNESS: 1
NECK STIFFNESS: 0
CHILLS: 1
VOMITING: 0
HALLUCINATIONS: 0
CONFUSION: 0
CHOKING: 0
PALPITATIONS: 0
FATIGUE: 1
NAUSEA: 0
APPETITE CHANGE: 0
HEADACHES: 0
TROUBLE SWALLOWING: 0
COLOR CHANGE: 0
NUMBNESS: 0
LIGHT-HEADEDNESS: 1
FEVER: 0
ACTIVITY CHANGE: 1
EYE REDNESS: 0
CHEST TIGHTNESS: 1
WOUND: 0
FLANK PAIN: 0
DECREASED CONCENTRATION: 1
TREMORS: 0
ABDOMINAL PAIN: 0
ARTHRALGIAS: 0
SEIZURES: 0
DYSPHORIC MOOD: 1
COUGH: 1

## 2017-09-21 NOTE — ED PROVIDER NOTES
History     Chief Complaint   Patient presents with     Alcohol Intoxication     Drinking 1/2 pint of Vodka and some Elsa today. Has a raspy voice normally, however patient is slurring his words in triage.      Shortness of Breath     C/O SOB starting last night.      HPI  Richard Montoya is a 62 year old male who presents emergency room with shortness breath and chest tightness along with seeking detox from alcohol.  Patient is not a good history giver initially is intoxicated was dropped off.  Patient drank vodka and elsa today.  Patient normally has a raspy voice although he states he has difficulty speaking because he is intoxicated currently.  Patient notes last couple days having some shortness of breath increasing cough some chest tightness also patient now presents her for evaluation no abdominal pain no nausea vomiting no recent trauma patient denies history of seizures or DTs but does have marked shakiness with alcohol withdrawal.    I have reviewed the Medications, Allergies, Past Medical and Surgical History, and Social History in the Epic system.    Review of Systems   Constitutional: Positive for activity change, chills and fatigue. Negative for appetite change and fever.   HENT: Positive for voice change (affected by drinking). Negative for congestion and trouble swallowing.    Eyes: Negative for redness and visual disturbance.   Respiratory: Positive for cough, chest tightness and shortness of breath. Negative for choking.    Cardiovascular: Negative for chest pain (hest tightness), palpitations and leg swelling.   Gastrointestinal: Negative for abdominal pain, nausea and vomiting.   Genitourinary: Negative for difficulty urinating and flank pain.   Musculoskeletal: Positive for gait problem (intoxicated). Negative for arthralgias and neck stiffness.   Skin: Negative for color change, rash and wound.   Allergic/Immunologic: Negative for immunocompromised state.   Neurological: Positive for  "weakness and light-headedness. Negative for tremors, seizures, syncope, numbness and headaches.   Psychiatric/Behavioral: Positive for decreased concentration and dysphoric mood. Negative for confusion and hallucinations.   All other systems reviewed and are negative.      Physical Exam   BP: 97/66  Heart Rate: 104  Temp: 99.8  F (37.7  C)  Resp: 20  Height: 185.4 cm (6' 1\")  Weight: 99.8 kg (220 lb)  SpO2: 92 %  Physical Exam   Constitutional: He is oriented to person, place, and time. He appears well-developed and well-nourished. He appears distressed.   Patient moderate distress does smell of alcohol is cooperative here.   HENT:   Head: Normocephalic and atraumatic.   No facial trauma   Eyes: Conjunctivae and EOM are normal. Pupils are equal, round, and reactive to light. No scleral icterus.   Neck: Normal range of motion. Neck supple. No tracheal deviation present.   Cardiovascular:   Atrial fibrillation chronic with rate controlled approximate 88   Pulmonary/Chest: No stridor. He is in respiratory distress. He has wheezes. He exhibits no tenderness.   Abdominal: He exhibits no distension and no mass. There is no tenderness. There is no rebound and no guarding.   Musculoskeletal: He exhibits no edema, tenderness or deformity.   Neurological: He is alert and oriented to person, place, and time. He has normal reflexes. No cranial nerve deficit. Coordination normal.   Nonfocal patient was intoxicated initially speech is somewhat soft but patient still able to enunciate without signs of any acute stroke or neurological deficit.   Skin: Skin is warm and dry. No rash noted. He is not diaphoretic. No erythema. No pallor.   Psychiatric:   Intoxicated somewhat flat affect noted here in the ER but cooperative   Nursing note and vitals reviewed.      ED Course     ED Course     In ER patient evaluated EKG was done revealing chronic atrial fibrillation.  Chest x-ray revealed no pneumothorax or effusion.    Patient given 160 " mg baby aspirin here in the ER    Reviewing patient's records and Epic A year ago patient was admitted to cardiology for elevated troponins at that point.    Patient notes he is homeless currently unclear if he has any medications with him.  Patient does note that he has history of some underlying COPD etc. To    Laboratory testing troponins ×2 over 4 hours were both normal white count was 11.1 hemoglobin 13.9 INR 0.9 chemistries reveal    magnesium 2.4    Patient ER was given Xopenex neb initially then duo neb also later patient continues to require oxygen supplementation is oxygen saturations range 88-90% on room air.    Patient given Solu-Medrol 125 mg IV ×1    Mild withdrawal symptoms patient given Ativan 1 mg orally.  Patient needs on oxygen.    Discussed with medicine patient will be admitted to medicine service for COPD exacerbation along with alcohol withdrawal patient agrees with plan in stable here in the ER.        Procedures             EKG Interpretation:      Interpreted by Jae Ahumada  Time reviewed: 1745  Symptoms at time of EKG: Shortness of breath chest tightness   Rhythm: Atrial fibrillation  Rate: 88  Axis: normal  Ectopy: none  Conduction: Right bundle branch block  ST Segments/ T Waves: Nonspecific ST-T wave changes  Q Waves: none  Comparison to prior: Unchanged    Clinical Impression: Atrial fibrillation rate controlled right bundle branch block old            Critical Care time:  none           Labs Ordered and Resulted from Time of ED Arrival Up to the Time of Departure from the ED   CBC WITH PLATELETS DIFFERENTIAL - Abnormal; Notable for the following:        Result Value    WBC 11.1 (*)     RDW 15.3 (*)     All other components within normal limits   COMPREHENSIVE METABOLIC PANEL - Abnormal; Notable for the following:     Chloride 110 (*)     Calcium 8.3 (*)      (*)      (*)     All other components within normal limits   MAGNESIUM - Abnormal;  Notable for the following:     Magnesium 2.4 (*)     All other components within normal limits   ALCOHOL BREATH TEST POCT - Abnormal; Notable for the following:     Alcohol Breath Test 0.177 (*)     All other components within normal limits   INR   PARTIAL THROMBOPLASTIN TIME   LIPASE   TROPONIN I   NT PROBNP INPATIENT   TROPONIN I   DRUG ABUSE SCREEN 6 CHEM DEP URINE ([facility])   PULSE OXIMETRY NURSING   CARDIAC CONTINUOUS MONITORING   PERIPHERAL IV CATHETER     Results for orders placed or performed during the hospital encounter of 09/20/17   XR Chest Port 1 View    Narrative    CHEST ONE VIEW PORTABLE    9/20/2017 6:46 PM     HISTORY: Shortness of breath. Chest pain.    COMPARISON: 9/18/2016.      Impression    IMPRESSION: Streaky retrocardiac opacity could represent atelectasis  or pneumonia. Remaining lungs are clear. No pneumothorax or pleural  effusion identified. Degenerative changes in the spine. Cardiac  silhouette within normal limits.    MOHIT OLMEDO MD   CBC with platelets differential   Result Value Ref Range    WBC 11.1 (H) 4.0 - 11.0 10e9/L    RBC Count 4.78 4.4 - 5.9 10e12/L    Hemoglobin 13.9 13.3 - 17.7 g/dL    Hematocrit 42.8 40.0 - 53.0 %    MCV 90 78 - 100 fl    MCH 29.1 26.5 - 33.0 pg    MCHC 32.5 31.5 - 36.5 g/dL    RDW 15.3 (H) 10.0 - 15.0 %    Platelet Count 188 150 - 450 10e9/L    Diff Method Automated Method     % Neutrophils 71.1 %    % Lymphocytes 18.2 %    % Monocytes 6.5 %    % Eosinophils 3.3 %    % Basophils 0.6 %    % Immature Granulocytes 0.3 %    Nucleated RBCs 0 0 /100    Absolute Neutrophil 7.9 1.6 - 8.3 10e9/L    Absolute Lymphocytes 2.0 0.8 - 5.3 10e9/L    Absolute Monocytes 0.7 0.0 - 1.3 10e9/L    Absolute Eosinophils 0.4 0.0 - 0.7 10e9/L    Absolute Basophils 0.1 0.0 - 0.2 10e9/L    Abs Immature Granulocytes 0.0 0 - 0.4 10e9/L    Absolute Nucleated RBC 0.0    INR   Result Value Ref Range    INR 0.90 0.86 - 1.14   Partial thromboplastin time   Result Value Ref Range    PTT 24  22 - 37 sec   Comprehensive metabolic panel   Result Value Ref Range    Sodium 143 133 - 144 mmol/L    Potassium 4.1 3.4 - 5.3 mmol/L    Chloride 110 (H) 94 - 109 mmol/L    Carbon Dioxide 23 20 - 32 mmol/L    Anion Gap 10 3 - 14 mmol/L    Glucose 86 70 - 99 mg/dL    Urea Nitrogen 23 7 - 30 mg/dL    Creatinine 1.07 0.66 - 1.25 mg/dL    GFR Estimate 70 >60 mL/min/1.7m2    GFR Estimate If Black 84 >60 mL/min/1.7m2    Calcium 8.3 (L) 8.5 - 10.1 mg/dL    Bilirubin Total 0.3 0.2 - 1.3 mg/dL    Albumin 3.5 3.4 - 5.0 g/dL    Protein Total 6.8 6.8 - 8.8 g/dL    Alkaline Phosphatase 75 40 - 150 U/L     (H) 0 - 70 U/L     (H) 0 - 45 U/L   Lipase   Result Value Ref Range    Lipase 105 73 - 393 U/L   Magnesium   Result Value Ref Range    Magnesium 2.4 (H) 1.6 - 2.3 mg/dL   Troponin I   Result Value Ref Range    Troponin I ES <0.015 0.000 - 0.045 ug/L   Nt probnp inpatient (BNP)   Result Value Ref Range    N-Terminal Pro BNP Inpatient 390 0 - 900 pg/mL   Troponin I   Result Value Ref Range    Troponin I ES 0.016 0.000 - 0.045 ug/L   EKG 12-lead, tracing only   Result Value Ref Range    Interpretation ECG Click View Image link to view waveform and result    Alcohol breath test POCT   Result Value Ref Range    Alcohol Breath Test 0.177 (A) 0.00 - 0.01              Assessments & Plan (with Medical Decision Making)  62-year-old male history of alcohol abuse history of COPD history of chronic atrial fibrillation presents with shortness of breath chest tightness without occult intoxication.  Patient evaluated in the ER with EKG chronic atrial fibrillation.  Patient troponin ×2 negative BNP normal white count 11.1 chest x-ray reveals no obvious pneumothorax or effusion question of atelectasis seen.  Patient given duo nebs in the ER still somewhat hypoxic requires oxygen supplementation patient agrees plan Will admit to medicine service for COPD exacerbation along with alcohol withdrawal protocol no history of seizures or  DTs.  Patient is voluntary.           I have reviewed the nursing notes.    I have reviewed the findings, diagnosis, plan and need for follow up with the patient.    New Prescriptions    No medications on file       Final diagnoses:   COPD exacerbation (H)   Alcohol abuse with intoxication (H)   Alcohol withdrawal, with unspecified complication (H)   Hypoxia   Paroxysmal atrial fibrillation (H)       9/20/2017   [facility], EMERGENCY DEPARTMENT      This note was created at least in part by the use of dragon voice dictation system. Inadvertent typographical errors may still exist.  Jae Ahumada MD.         Jae Ahumada MD  09/21/17 8497

## 2017-09-21 NOTE — PROGRESS NOTES
Pt arrived on unit from ED and was able to ambulate to room bed with SBA/ assist + 1.  R PIV saline locked.  Pt was admitted by writer and seen by crosscover providers.  Home medications sent to security.  All other belongings in room- see Summary of care note.  Pt admitted by writer.

## 2017-09-21 NOTE — PROGRESS NOTES
"Social Work: Assessment with Discharge Plan    Patient Name:  Richard Montoya  :  1955  Age:  62 year old  MRN:  [id number]  Risk/Complexity Score:     Completed assessment with:  Patient, medicine, CADI CM, admissions, detox    Presenting Information   Reason for Referral: disposition, homeless, EtOH  Date of Intake:  2017  Referral Source:  Chart Review  Decision Maker:  self  Alternate Decision Maker:  shanique  Health Care Directive:  Declined completing  Living Situation:  Homeless was residing in a sober living home for the last 7 months - reports he went to [employer] and drank 3 pints of vodka and was suspended from his residence for 30 days for this.   Previous Functional Status:  Independent  Patient and family understanding of hospitalization:  appropriate  Cultural/Language/Spiritual Considerations:    Adjustment to Illness:  Richard is a pleasant man who is forthcoming and insightful. He readily owns his situation stating \"its my own fault im in this mess\" and states he \"knows the system and the barriers and his lack of options.\"    Physical Health  Reason for Admission:    1. COPD exacerbation (H)    2. Alcohol abuse with intoxication (H)    3. Alcohol withdrawal, with unspecified complication (H)    4. Hypoxia    5. Paroxysmal atrial fibrillation (H)      Services Needed/Recommended:  Other:  shelter/rule 25    Mental Health/Chemical Dependency  Diagnosis:  EtOH dependency, chronic abuse  Support/Services in Place:  30 day suspension from his prior residence/sober living as penalty for drinking 17.   Services Needed/Recommended:  Patient expresses interest in outpatient CD treatment/support. SW reviewed- patient needs a Rule 25 assessment through [facility] CD assessors due to his insurance.     Support System  Significant relationship at present time:  Richard has two \"old friends\" listed on his face sheet. He declines sharing any family information at this time stating \"no ones " "around\"  Family of origin is available for support:  no  Other support available:  CADI Case management, sober living home  Gaps in support system:  Lack of housing, needs Rule 25, needs treatment-IP desired   Patient is caregiver to:  None     Provider Information   Primary Care Physician:  Lui Carballo   [phone]   Clinic:  [facility] [address] / [facility]*      :  [facility] [place] [phone], [phone]    Financial   Income Source:  SSDI  Financial Concerns: patient has a rep payee through [employer]  Insurance:    Payor/Plan Subscriber Name Rel Member # Group #   MEDICARE - MEDICARE RICHARD APPIAH  [id number]       ATTN CLAIMS, [address]   MEDICA - MEDICA ACCES* RICHARD APPIAH W  [id number]      [address] [address]       Discharge Plan   Patient and family discharge goal:  \"anywhere\" except the shelter resources ANG offered. Ramos was very open to discussion with ANG about the process and options for DC. He then stated \"well I will go out and get wasted and then go to detox, they can give me a bed and help me get a rule 25\". ANG explored this cons of this with Richard and he was understanding. However he then went to smoke a cigarette and returned intoxicated stating \"I am ready to get into detox, find me a bed hold and get me a ride\" ANG was able to secure a bed hold for Richard at [facility] and updated him. He states he doesn't want to go there really but knows it is his best option and he will have a good chance of having his rule 25 completed. ang provided several other written resources for food/shelter/crisis  Provided education on discharge plan:  YES  Patient agreeable to discharge plan:  YES  A list of Medicare Certified Facilities was provided to the patient and/or family to encourage patient choice. Patient's choices for facility are:  [facility].   Will NH provide Skilled rehabilitation or complex medical:  NO  General information regarding " anticipated insurance coverage and possible out of pocket cost was discussed. Patient and patient's family are aware patient may incur the cost of transportation to the facility, pending insurance payment: YES  Barriers to discharge:      Discharge Recommendations   Anticipated Disposition:  pursuit of rule 25  Transportation Needs:  Cab:  [facility]  Name of Transportation Company and Phone:  [employer] [facility] [employer] [facility]    Additional comments   JACEK CABRAL from [place] [facility]: [facility] @ T: [phone]/ [phone]    Referred to [facility] 2 PM- denied no skilled need    UPDATE on 9/22:  [facility] denied due to no skilled needs.   SW referred to [facility]/sober housing: T: dan in admissions: [phone] -denied no beds      [facility]: holding a bed for Transfer for dc this afternoon.  [phone]    Miranda PARKER MSW  5B  (Medical/Surgical)  Phone: [phone]  Pager: [phone]

## 2017-09-21 NOTE — PLAN OF CARE
VSS, MSSA 5, alert orientated, denies pain, some dyspnea, feels nebs help, off unit frequently ambulating independently, able to make needs known

## 2017-09-21 NOTE — PROGRESS NOTES
"Disoriented to time.  VSS on RA- occasional desat to high 80s w/ quick recovery above 92%.  Up with assist +1- walker ordered.  Reports baseline pain \"everywhere,\" but was comfortable without analgesics.  MSSA score: 9- 1mg PO ativan administered, reassess between 7678-9127.  Urinal at bedside.  Alarm active for safety and impulsiveness.  Pt able to make needs known.  Continue per POC.  "

## 2017-09-21 NOTE — H&P
"  History and Physical  Internal Medicine      Date of Service: 17  Date of Admission: 2017  Patient Name: Richard Montoya  : 1955  MRN: [id number]     Chief complaint:   Concern for alcohol withdrawal, shortness of breath      History of Present Illness:   Richard Montoya is a 62 year old man with PMH of reported asthma vs COPD, HTN, HLD, atrial fibrillation not on anticoagulation, cardiomyopathy (EF 25-30%), non-Hodgkin's lymphoma, and alcohol abuse who presents for concern for alcohol withdrawal and shortness of breath.    Patient has been sober for 7 months and living at sober living house.  Went on recent drinking binge starting 2 days ago. He drank 3 pints of vodka yesterday. Reports his last drink 3 pm yesterday afternoon. He denies vomiting during this binge. He denies falling or hitting his head. Has history of withdrawal symptoms including \"the shakes\" and remote history of seizures/DT.      Patient also reports having 3 weeks of cough productive of yellow/brown sputum. No hematemesis. Patient reports having baseline shortness of breath and increased work of breathing that has worsened in the past few days.  Endorses PND over the past week.  Ran out of inhalers at home, has not used them for the past couple of months. Endorses chest pressure/tightness, no chest pain. Endorses sick contacts, reporting that everyone in sober living house had cough.     Denies fever, HA, vision changes, difficulty swallowing, chest pain, heart palpitations, nausea/vomiting, abdominal pain, diarrhea/constipation. Smokes 1 ppd since 11 years old. Denies any other illicit drugs, per chart review has past hx of cocaine use.     In the [place] ED the patient was afebrile and hemodynamically stable with mild tachycardia (). He had O2 sat of 92% on room air. He was intoxicated and smelling of alcohol. He looked to be in respiratory distress with wheezes noted on exam. Labs were significant for WBC 11.1, ALT " "100, , troponin 0.016, positive alcohol breath test. EKG showed atrial fibrillation with stable right bundle branch block. CXR showed retrocardiac opacity. He was transferred here for further management. He was treated with 160 mg aspirin, xopenex, duo neb, and solu-medrol and supplementary oxygen. He also got ativan 1 mg to treat mild withdrawal symptoms.      Review of Symptoms:     Comprehensive 10 point review of systems was negative unless otherwise noted in the HPI.     Past Medical History:     Past Medical History:   Diagnosis Date     Arthritis      Asthma Since childhood    Denies COPD hx. Last PFTs not available for reveiw     Atrial fibrillation (H)     On chronic couadin, goal INR 2-3     Cancer (H) 2001    Non- hodgkin's Lymphoma with current remission     Hypertension      Osteoarthritis     Bilateral hips.      Pulmonary nodules     \"Too small to be anything\". Following with oncology with f/u studies.      Substance abuse Ongoing    Alcohol, cocaine (nasal ingestion)     TBI (traumatic brain injury) (H)      Tobacco dependence age 12    1ppd.      Unspecified cerebral artery occlusion with cerebral infarction 2003       Past Surgical History:   Procedure Laterality Date     APPENDECTOMY       ESOPHAGOSCOPY, GASTROSCOPY, DUODENOSCOPY (EGD), COMBINED  1/19/2014    Procedure: COMBINED ESOPHAGOSCOPY, GASTROSCOPY, DUODENOSCOPY (EGD);;  Surgeon: Saw Dupree MD;  Location: [facility]     LAPAROTOMY EXPLORATORY      Abdomen 2/2 concern for cancer lesions. Biopsies negative per patient     ORTHOPEDIC SURGERY  1994    Left DANIELA        Allergies:     Allergies   Allergen Reactions     Sudafed [Pseudoephedrine] Other (See Comments)     Patient reports that he turns red.      Red Dye      Patient reports that he's only had issues with the red dye in Sudafed, he's tolerated other medications that contain red dye.        Outpatient Medications:       No current facility-administered medications on file " prior to encounter.   Current Outpatient Prescriptions on File Prior to Encounter:  metoprolol (TOPROL-XL) 50 MG 24 hr tablet Take 1 tablet (50 mg) by mouth daily   lisinopril (PRINIVIL/ZESTRIL) 2.5 MG tablet Take 1 tablet (2.5 mg) by mouth daily   citalopram (CELEXA) 20 MG tablet Take 1 tablet (20 mg) by mouth daily   aspirin 81 MG tablet Take 1 tablet (81 mg) by mouth daily   lisinopril (PRINIVIL,ZESTRIL) 2.5 MG tablet Take 1 tablet (2.5 mg) by mouth daily   metoprolol (TOPROL-XL) 50 MG 24 hr tablet Take 1 tablet (50 mg) by mouth daily   furosemide (LASIX) 20 MG tablet Take 1 tablet (20 mg) by mouth daily   acetaminophen (TYLENOL) 500 MG tablet Take 1 tablet (500 mg) by mouth 4 times daily   albuterol (PROAIR HFA, PROVENTIL HFA, VENTOLIN HFA) 108 (90 BASE) MCG/ACT inhaler Inhale 2 puffs into the lungs every 4 hours as needed for shortness of breath / dyspnea   folic acid (FOLVITE) 1 MG tablet Take 1 tablet (1 mg) by mouth daily   senna-docusate (SENOKOT-S;PERICOLACE) 8.6-50 MG per tablet Take 2 tablets by mouth 2 times daily as needed for constipation   nicotine polacrilex (COMMIT) 2 MG lozenge Place 2 lozenges (4 mg) inside cheek every hour as needed for smoking cessation   multivitamin, therapeutic with minerals (THERA-VIT-M) TABS Take 1 tablet by mouth daily   loperamide (IMODIUM) 2 MG capsule Take 2 mg by mouth 4 times daily as needed for diarrhea   Aspirin 81 MG TBDP Take 81 mg by mouth daily   fluticasone-salmeterol (ADVAIR) 500-50 MCG/DOSE diskus inhaler Inhale 1 puff into the lungs every 12 hours   tiotropium (SPIRIVA) 18 MCG inhalation capsule Inhale 1 capsule (18 mcg) into the lungs daily   gabapentin (NEURONTIN) 300 MG capsule Take 1 capsule (300 mg) by mouth 3 times daily   citalopram (CELEXA) 20 MG tablet Take 1 tablet (20 mg) by mouth daily   sodium chloride (OCEAN) 0.65 % nasal spray Spray 1 spray into both nostrils daily as needed for congestion   cholecalciferol 1000 UNITS TABS Take 1,000 Units by  "mouth daily        Family History:     Family History   Problem Relation Age of Onset     C.A.D. No family hx of      DIABETES No family hx of      CANCER No family hx of      Including colon cancer     Alcohol/Drug No family hx of      Asthma No family hx of      Substance Abuse Mother      Substance Abuse Father      Substance Abuse Maternal Grandmother      Substance Abuse Maternal Grandfather      Substance Abuse Paternal Grandmother      Substance Abuse Paternal Grandfather      Suicide Paternal Grandfather      Intellectual Disability (Mental Retardation) Sister         Social History:   [facility] lives in [facility].  Smokes 1 ppd. Sober for 7 months until 2 days ago. Denies any illicit drugs.     Physical Exam:   Blood pressure 98/57, temperature 99.8  F (37.7  C), resp. rate 17, height 1.854 m (6' 1\"), weight 99.8 kg (220 lb), SpO2 93 %.  Temp (24hrs), Av.8  F (37.7  C), Min:99.8  F (37.7  C), Max:99.8  F (37.7  C)    Gen: In no acute distress. Patient lying in bed.   HEENT: NC/AT, no scleral icterus, Moist mucous membranes. No nasal discharge.  CV: Irregularly irregular,  no murmurs, rubs or gallops   Resp: Poor air movement at the bases. Wheezes throughout.   Abdomen: Soft, non tender abdomen, normal active bowel sounds, no rebound/guarding   Extremities: No peripheral edema, warm and well perfused   Skin: Wthout rash or trauma, no spider angiomas or caput medusa  Neuro:   Intermittently slurring of speech  CN II-XI grossly intact, good proprioception, no asterixis  Ambulating without difficulty      Data:     CMP  Recent Labs  Lab 17      POTASSIUM 4.1   CHLORIDE 110*   CO2 23   ANIONGAP 10   GLC 86   BUN 23   CR 1.07   GFRESTIMATED 70   GFRESTBLACK 84   MARTHA 8.3*   MAG 2.4*   PROTTOTAL 6.8   ALBUMIN 3.5   BILITOTAL 0.3   ALKPHOS 75   *   *     CBC  Recent Labs  Lab 17   WBC 11.1*   RBC 4.78   HGB 13.9   HCT 42.8   MCV 90   MCH 29.1   MCHC 32.5   RDW " 15.3*        INR  Recent Labs  Lab 09/20/17  1816   INR 0.90         Assessment/Plan:     Richard Montoya is a 62 year old man with PMH of reported asthma vs COPD, HTN, HLD, atrial fibrillation not on anticoagulation, cardiomyopathy (EF 25-30%), non-Hodgkin's lymphoma, and alcohol abuse who presents for concern for alcohol withdrawal and shortness of breath.    # Alcohol withdrawal  Patient reported last drink was 9/20/17 with history of serious withdrawal symptoms including DTs.   - [facility] protocol  - Falls precaution, can remove once patient more steady    # Acute hypoxic respiratory failure  # Asthma vs COPD exacerbation  Patient presented with shortness of breath, increased sputum production, and wheezes on exam. Unclear whether patient has diagnosis of asthma vs COPD. Has reported history of asthma as a child but significant smoking history could also have COPD.  No PFTs on file. Patient without fever, very mild leukocytosis, and CXR not convincing for pneumonia.   - Duonebs prn  - Albuterol prn  - Continue PTA spiriva  - [place] PTA advair    - Chronic medical problems  # CAD - continue PTA aspirin, metoprolol,   # HTN - hold lisinopril   # Smoking - continue PTA nicotine lozenge  # Atrial fibrillation - likely not candidate for anticoagulation if continued drinking    DIET: Regular  PPX: DVT: NONE   DISPO: 3-4 days   CODE: Patient reported DNR but given was still intoxicated, kept full code. Should reassess.    Patient will be formally staffed in the AM. Please see attending addendum for changes to plan.     Kinga Conteh  Internal Medicine PGY1  Pager: [phone]

## 2017-09-21 NOTE — PHARMACY-ADMISSION MEDICATION HISTORY
Admission medication history interview status for the 9/20/2017 admission is complete. See Epic admission navigator for allergy information, pharmacy, prior to admission medications and immunization status.     Medication history interview sources:  patient.    Changes made to PTA medication list (reason)  Added: N/A  Deleted: aspirin 81 mg tablet (Qty 3), citalopram 20 mg (Qty 3), lisinopril 2.5 mg (Qty 3), metoprolol 50 mg 24 hr tab (Qty 3), nicotine lozenge (per pt).  Changed: N/A    Additional medication history information (including reliability of information, actions taken by pharmacist): Patient has good reliability of information. Most of the adjustments to PTA med list were due to duplicate therapy. Patient is unsure of time of last doses, but states has taken all the scheduled medications prior to admission. Influenza vaccine date was updated.     Prior to Admission medications    Medication Sig Last Dose Taking? Auth Provider   lisinopril (PRINIVIL,ZESTRIL) 2.5 MG tablet Take 1 tablet (2.5 mg) by mouth daily 9/19/2017 at Unknown time Yes David Fenton MD   metoprolol (TOPROL-XL) 50 MG 24 hr tablet Take 1 tablet (50 mg) by mouth daily 9/19/2017 at Unknown time Yes David Fenton MD   furosemide (LASIX) 20 MG tablet Take 1 tablet (20 mg) by mouth daily 9/19/2017 at Unknown time Yes David Fenton MD   acetaminophen (TYLENOL) 500 MG tablet Take 1 tablet (500 mg) by mouth 4 times daily 9/19/2017 at Unknown time Yes Samm Holder MD   folic acid (FOLVITE) 1 MG tablet Take 1 tablet (1 mg) by mouth daily 9/19/2017 at Unknown time Yes Samm Holder MD   multivitamin, therapeutic with minerals (THERA-VIT-M) TABS Take 1 tablet by mouth daily 9/19/2017 at Unknown time Yes Samm Holder MD   Aspirin 81 MG TBDP Take 81 mg by mouth daily 9/19/2017 at Unknown time Yes Jf Limon MD   fluticasone-salmeterol (ADVAIR) 500-50 MCG/DOSE diskus inhaler Inhale 1 puff into the lungs every 12 hours  9/19/2017 at Unknown time Yes Jf Limon MD   tiotropium (SPIRIVA) 18 MCG inhalation capsule Inhale 1 capsule (18 mcg) into the lungs daily 9/19/2017 at Unknown time Yes Jf Limon MD   gabapentin (NEURONTIN) 300 MG capsule Take 1 capsule (300 mg) by mouth 3 times daily 9/19/2017 at Unknown time Yes Jf Limon MD   citalopram (CELEXA) 20 MG tablet Take 1 tablet (20 mg) by mouth daily 9/19/2017 at Unknown time Yes Jf Limon MD   cholecalciferol 1000 UNITS TABS Take 1,000 Units by mouth daily 9/19/2017 at Unknown time Yes Jf Limon MD   albuterol (PROAIR HFA, PROVENTIL HFA, VENTOLIN HFA) 108 (90 BASE) MCG/ACT inhaler Inhale 2 puffs into the lungs every 4 hours as needed for shortness of breath / dyspnea Unknown at Unknown time  Samm Holder MD   senna-docusate (SENOKOT-S;PERICOLACE) 8.6-50 MG per tablet Take 2 tablets by mouth 2 times daily as needed for constipation Unknown at Unknown time  Samm Holder MD   loperamide (IMODIUM) 2 MG capsule Take 2 mg by mouth 4 times daily as needed for diarrhea Unknown at Unknown time  Reported, Patient   sodium chloride (OCEAN) 0.65 % nasal spray Spray 1 spray into both nostrils daily as needed for congestion Unknown at Unknown time  Jf Limon MD         Medication history completed by: Nancy Gordon, PharmD-4

## 2017-09-21 NOTE — H&P
Internal Medicine History and Physical    Richard Montoya MRN# [id number]   Age: 62 year old YOB: 1955     Date of Admission:  9/20/2017    Primary care provider: Lui Carballo          Assessment and Plan:   Assessment:  Richard Montoya is a 61 y/o male with PMH significant for alcohol abuse and withdrawal with DTs, chronic atrial fibrillation, non-hodgkin's lymphoma, in remission, and HTN who presents with acute alcohol intoxication and mild SOB.    Plan:  # Acute Alcohol Intoxication  # Hx of DTs  Unclear whether he is in danger of withdrawing, giving he has been sober for past 7 months, and only been drinking for last 2 days. ASHLEY 0.177 at 17:11.   - [facility] protocol  - Fall precautions  - Need for inpatient vs outpatient treatment    # Mild COPD exacerbation  Received 125 mg of solu-medrol in the ED. Does not appear in any respiratory distress. O2 sats 92-96% on RA. WBC count mildly elevated at 11.1. Chest XR showing possible streaky opacity.   -Duonebs q4hrs  -Albuterol neb q2hrs prn  -Continuous pulse ox  -Start Azithromycin    # Chronic Atrial Fibrillation  Rate well-controlled. Not on anticoagulation. HRs 60's-100  -Continue metoprolol and aspirin    #HTN  -Will hold lisinopril given soft BPs of 80's-90's/50-60's.    FEN: PO, monitor and replete lytes, Regular diet  Prophylaxis: GI: Protonix      Code Status: DNR, but ok to intubate.    Disposition: Pending clearance of intoxication and placement to alcohol treatment. Likely 1-2 days.    Patient seen and discussed with Dr. Conteh, PGY1.    Rolly Tellez, MS4          Chief Complaint:   Seeking treatment for alcohol abuse, cough         History of Present Illness:   Richard Montoya is a 61 y/o male with PMH significant for alcohol abuse, with DTs, chronic atrial fibrillation, and HTN who presents with acute alcohol intoxication and mild SOB. He says he had been sober for 7 months, but relapsed 2 days ago. He says he drank 3 pints of vodka in the past 2  "days, with his last drink being at 3 pm 9/20. He notes having severe withdrawals in the past, which have included hallucinations, tremors, and seizures. He came in because he wants help quitting alcohol. He denies any recent falls or hitting his head. He has also noticed having a productive cough, with yellowish sputum for the past 2 weeks. Also mildly SOB, but he isn't too concerned about it. He says his chest feels tight intermittently. He stopped taking his daily inhaler a few months ago because it ran out. It is unclear if he has taken any of his medications recently. He denies any fevers, chills, rashes difficulty swallowing, chest pain, palpitations, abdominal pain, N/V/D, or constipation.           Review of Systems:     Comprehensive Review of Systems negative except otherwise noted in HPI.          Past Medical History:   Medical History reviewed.   Past Medical History:   Diagnosis Date     Arthritis      Asthma Since childhood    Denies COPD hx. Last PFTs not available for reveiw     Atrial fibrillation (H)     On chronic couadin, goal INR 2-3     Cancer (H) 2001    Non- hodgkin's Lymphoma with current remission     Hypertension      Osteoarthritis     Bilateral hips.      Pulmonary nodules     \"Too small to be anything\". Following with oncology with f/u studies.      Substance abuse Ongoing    Alcohol, cocaine (nasal ingestion)     TBI (traumatic brain injury) (H)      Tobacco dependence age 12    1ppd.      Unspecified cerebral artery occlusion with cerebral infarction 2003             Past Surgical History:   Surgical History reviewed.   Past Surgical History:   Procedure Laterality Date     APPENDECTOMY       ESOPHAGOSCOPY, GASTROSCOPY, DUODENOSCOPY (EGD), COMBINED  1/19/2014    Procedure: COMBINED ESOPHAGOSCOPY, GASTROSCOPY, DUODENOSCOPY (EGD);;  Surgeon: Saw Dupree MD;  Location: [facility]     LAPAROTOMY EXPLORATORY      Abdomen 2/2 concern for cancer lesions. Biopsies negative per patient " "    ORTHOPEDIC SURGERY  1994    Left DANIELA             Social History:   Social History reviewed. He denies any other drug use.  Social History   Substance Use Topics     Smoking status: Current Every Day Smoker     Packs/day: 1.50     Years: 47.00     Types: Cigarettes     Smokeless tobacco: Never Used     Alcohol use Yes      Comment: 2 pints vodka daily x40 years             Family History:   Family History reviewed.  Family History   Problem Relation Age of Onset     Substance Abuse Mother      Substance Abuse Father      Substance Abuse Maternal Grandmother      Substance Abuse Maternal Grandfather      Substance Abuse Paternal Grandmother      Substance Abuse Paternal Grandfather      Suicide Paternal Grandfather      Intellectual Disability (Mental Retardation) Sister      C.A.D. No family hx of      DIABETES No family hx of      CANCER No family hx of      Including colon cancer     Alcohol/Drug No family hx of      Asthma No family hx of              Allergies:     Allergies   Allergen Reactions     Sudafed [Pseudoephedrine] Other (See Comments)     Patient reports that he turns red.      Red Dye      Patient reports that he's only had issues with the red dye in Sudafed, he's tolerated other medications that contain red dye.             Medications:   Medications Reviewed.   Current Facility-Administered Medications   Medication     lidocaine 1 % 1 mL     lidocaine (LMX4) kit     sodium chloride (PF) 0.9% PF flush 3 mL     sodium chloride (PF) 0.9% PF flush 3 mL     LORazepam (ATIVAN) tablet 1-4 mg     naloxone (NARCAN) injection 0.1-0.4 mg     albuterol neb solution 2.5 mg     ipratropium - albuterol 0.5 mg/2.5 mg/3 mL (DUONEB) neb solution 3 mL             Physical Exam:   Vitals were reviewed.  Blood pressure 98/57, temperature 99.8  F (37.7  C), resp. rate 17, height 1.854 m (6' 1\"), weight 99.8 kg (220 lb), SpO2 93 %.    General: Well-appearing, faint smell of etoh, in no apparent distress.  Skin: Not " jaundiced, no rash, no ecchymoses  HEENT: MMM, PERRLA, EOM intact  CV: Irregularly irregular. Normal rate. No murmurs.  Resp: Mild expiratory wheezes throughout lung fields.  Abd: Soft, non-tender, BS+, no masses appreciated  Extremities: warm and well perfused, palpable pulses, no edema  Neuro: CN II-XII intact. Mild tremor in bilateral UEs. Coordination slightly off with finger-to-nose. Speech is slurred.           Data:        ROUTINE LABS (Last four results)  CMP  Recent Labs  Lab 09/20/17  1816      POTASSIUM 4.1   CHLORIDE 110*   CO2 23   ANIONGAP 10   GLC 86   BUN 23   CR 1.07   GFRESTIMATED 70   GFRESTBLACK 84   MARTHA 8.3*   MAG 2.4*   PROTTOTAL 6.8   ALBUMIN 3.5   BILITOTAL 0.3   ALKPHOS 75   *   *     CBC  Recent Labs  Lab 09/20/17  1816   WBC 11.1*   RBC 4.78   HGB 13.9   HCT 42.8   MCV 90   MCH 29.1   MCHC 32.5   RDW 15.3*        INR  Recent Labs  Lab 09/20/17  1816   INR 0.90     Arterial Blood GasNo lab results found in last 7 days.

## 2017-09-21 NOTE — SUMMARY OF CARE
Pt arrived on unit with the following belongings: Coat, sweater, shirt, pants, belt, socks, shoes, wallet (no cash), phone, phone , 1 pack of cigarettes, and medications (sent to security).  All belongings with the exception of medication are in room with patient.

## 2017-09-21 NOTE — PROGRESS NOTES
[facility] Initial Interview and Consult    2017    Patient: Richard Montoya      :  1955                    MRN:[id number]      Reason for Consult:  Patient with suspected COPD being followed by COPD Readmission Reduction Program    History of Present Illness: Richard Montoya is a 62 year old man with PMH of reported asthma vs COPD, but he doesn't have any clinical data to back up COPD diagnosis, HTN, HLD, atrial fibrillation not on anticoagulation, cardiomyopathy (EF 25-30%), non-Hodgkin's lymphoma, and alcohol abuse who presents for concern for alcohol withdrawal and shortness of breath.      Assessment:  Patient is on room air, SpO2 95%. Breath sounds are diminished. Spoke to Katie SOLORIO earlier today about patients admitting diagnosis of COPD. We both agreed that there is no clinical data to back up the diagnosis of COPD, even though he has a smoking history. MD agreed to remove admitting diagnosis of COPD off patients file. To get a formal diagnosis of COPD the patient will need to have a full PFT. Patient states that he is currently homeless but would like to go to a TCU. He states that he has had a productive cough, with yellow/white sputum. Per patient he has been smoking for about 50 years, smoking 1-2 packs per day. After talking with the patient he is interested in quitting smoking but doesn't feel like he would be capable of quitting.       Home respiratory medications include:  -Albuterol inhaler every 4 hours as needed   -Advair twice daily  -Spiriva once daily     Recommendations:  -Continue with current respiratory medication regimen    -Establish care with a Pulmonologist and get complete PFT's    -21 mg Nicotine Patch to help reduce symptoms of withdrawal and cravings     -Use Aerobika Oscillating PEP Device at least twice daily for 5-10 min/QID with Nebs to open smaller airways, improve mucus clearance, to decrease cough frequency, and to improve  exercise tolerance    -Use Aerochamber with MDI's for better drug deposition.    -Patient needs continued reinforcement and continued education on inhaler use and breath recovery techniques    -PT/OT consult to assess safety with returning home, functional abilities and limitations, home care needs and cognitive evaluation    -Patient needs nebulizer compressor at discharge with prescription for tubing, cups and mask.     -Walk test to determine O2 needs at rest and with exertion/activity, for continuous use    -At discharge continue with patient's home regimen of respiratory medications      Will continue to follow and support patient as needed. Will follow up with phone call 48 hours after discharge.     I spent 30 minutes with the patient.    Maria Del Carmen Jimenez,RRT  [facility]  Office [phone]  Pager [phone]

## 2017-09-21 NOTE — PLAN OF CARE
Problem: Patient Care Overview  Goal: Plan of Care/Patient Progress Review  Patient alert and oriented x 4. He scored 5 on MSSA. He has a good appetite. He is aware of his surroundings. He ambulated independently outside to smoke cigarettes. He mentioned back pain and had a 1 time order of ibuprofen that was given this afternoon. He had a cardiac echo today.  His cell number is on grease board in room if he is not in room and is needed. Continue to monitor for withdrawal symptoms.

## 2017-09-22 ENCOUNTER — APPOINTMENT (OUTPATIENT)
Dept: PHYSICAL THERAPY | Facility: CLINIC | Age: 62
DRG: 896 | End: 2017-09-22
Attending: INTERNAL MEDICINE
Payer: MEDICARE

## 2017-09-22 VITALS
WEIGHT: 217 LBS | OXYGEN SATURATION: 97 % | HEIGHT: 73 IN | HEART RATE: 96 BPM | BODY MASS INDEX: 28.76 KG/M2 | SYSTOLIC BLOOD PRESSURE: 134 MMHG | DIASTOLIC BLOOD PRESSURE: 65 MMHG | RESPIRATION RATE: 16 BRPM | TEMPERATURE: 96.6 F

## 2017-09-22 LAB
ALBUMIN SERPL-MCNC: 3.3 G/DL (ref 3.4–5)
ALP SERPL-CCNC: 74 U/L (ref 40–150)
ALT SERPL W P-5'-P-CCNC: 57 U/L (ref 0–70)
ANION GAP SERPL CALCULATED.3IONS-SCNC: 8 MMOL/L (ref 3–14)
AST SERPL W P-5'-P-CCNC: 24 U/L (ref 0–45)
BASOPHILS # BLD AUTO: 0 10E9/L (ref 0–0.2)
BASOPHILS NFR BLD AUTO: 0.2 %
BILIRUB SERPL-MCNC: 0.4 MG/DL (ref 0.2–1.3)
BUN SERPL-MCNC: 26 MG/DL (ref 7–30)
CALCIUM SERPL-MCNC: 9.1 MG/DL (ref 8.5–10.1)
CHLORIDE SERPL-SCNC: 107 MMOL/L (ref 94–109)
CO2 SERPL-SCNC: 25 MMOL/L (ref 20–32)
CREAT SERPL-MCNC: 0.95 MG/DL (ref 0.66–1.25)
DIFFERENTIAL METHOD BLD: ABNORMAL
EOSINOPHIL # BLD AUTO: 0 10E9/L (ref 0–0.7)
EOSINOPHIL NFR BLD AUTO: 0.1 %
ERYTHROCYTE [DISTWIDTH] IN BLOOD BY AUTOMATED COUNT: 15.7 % (ref 10–15)
GFR SERPL CREATININE-BSD FRML MDRD: 80 ML/MIN/1.7M2
GLUCOSE SERPL-MCNC: 84 MG/DL (ref 70–99)
HCT VFR BLD AUTO: 39.5 % (ref 40–53)
HGB BLD-MCNC: 12.7 G/DL (ref 13.3–17.7)
IMM GRANULOCYTES # BLD: 0 10E9/L (ref 0–0.4)
IMM GRANULOCYTES NFR BLD: 0.2 %
INR PPP: 0.83 (ref 0.86–1.14)
LYMPHOCYTES # BLD AUTO: 1.2 10E9/L (ref 0.8–5.3)
LYMPHOCYTES NFR BLD AUTO: 10.7 %
MCH RBC QN AUTO: 29.1 PG (ref 26.5–33)
MCHC RBC AUTO-ENTMCNC: 32.2 G/DL (ref 31.5–36.5)
MCV RBC AUTO: 90 FL (ref 78–100)
MONOCYTES # BLD AUTO: 1.1 10E9/L (ref 0–1.3)
MONOCYTES NFR BLD AUTO: 9.6 %
NEUTROPHILS # BLD AUTO: 8.9 10E9/L (ref 1.6–8.3)
NEUTROPHILS NFR BLD AUTO: 79.2 %
NRBC # BLD AUTO: 0 10*3/UL
NRBC BLD AUTO-RTO: 0 /100
PLATELET # BLD AUTO: 154 10E9/L (ref 150–450)
POTASSIUM SERPL-SCNC: 4.4 MMOL/L (ref 3.4–5.3)
PROCALCITONIN SERPL-MCNC: <0.05 NG/ML
PROT SERPL-MCNC: 6.4 G/DL (ref 6.8–8.8)
RBC # BLD AUTO: 4.37 10E12/L (ref 4.4–5.9)
SODIUM SERPL-SCNC: 140 MMOL/L (ref 133–144)
WBC # BLD AUTO: 11.2 10E9/L (ref 4–11)

## 2017-09-22 PROCEDURE — 94640 AIRWAY INHALATION TREATMENT: CPT | Performed by: OPTOMETRIST

## 2017-09-22 PROCEDURE — 80053 COMPREHEN METABOLIC PANEL: CPT | Performed by: PEDIATRICS

## 2017-09-22 PROCEDURE — 84145 PROCALCITONIN (PCT): CPT | Performed by: PEDIATRICS

## 2017-09-22 PROCEDURE — 97161 PT EVAL LOW COMPLEX 20 MIN: CPT | Mod: GP

## 2017-09-22 PROCEDURE — 40000556 ZZH STATISTIC PERIPHERAL IV START W US GUIDANCE

## 2017-09-22 PROCEDURE — 40000193 ZZH STATISTIC PT WARD VISIT

## 2017-09-22 PROCEDURE — 25000125 ZZHC RX 250: Performed by: INTERNAL MEDICINE

## 2017-09-22 PROCEDURE — 99239 HOSP IP/OBS DSCHRG MGMT >30: CPT | Performed by: INTERNAL MEDICINE

## 2017-09-22 PROCEDURE — 25000132 ZZH RX MED GY IP 250 OP 250 PS 637: Mod: GY | Performed by: INTERNAL MEDICINE

## 2017-09-22 PROCEDURE — 25000125 ZZHC RX 250: Performed by: STUDENT IN AN ORGANIZED HEALTH CARE EDUCATION/TRAINING PROGRAM

## 2017-09-22 PROCEDURE — 40000275 ZZH STATISTIC RCP TIME EA 10 MIN: Performed by: OPTOMETRIST

## 2017-09-22 PROCEDURE — 90686 IIV4 VACC NO PRSV 0.5 ML IM: CPT | Performed by: INTERNAL MEDICINE

## 2017-09-22 PROCEDURE — 85025 COMPLETE CBC W/AUTO DIFF WBC: CPT | Performed by: PEDIATRICS

## 2017-09-22 PROCEDURE — 25000128 H RX IP 250 OP 636: Performed by: INTERNAL MEDICINE

## 2017-09-22 PROCEDURE — 36415 COLL VENOUS BLD VENIPUNCTURE: CPT | Performed by: PEDIATRICS

## 2017-09-22 PROCEDURE — 25000132 ZZH RX MED GY IP 250 OP 250 PS 637: Mod: GY | Performed by: STUDENT IN AN ORGANIZED HEALTH CARE EDUCATION/TRAINING PROGRAM

## 2017-09-22 PROCEDURE — 94640 AIRWAY INHALATION TREATMENT: CPT | Mod: 76

## 2017-09-22 PROCEDURE — A9270 NON-COVERED ITEM OR SERVICE: HCPCS | Mod: GY | Performed by: STUDENT IN AN ORGANIZED HEALTH CARE EDUCATION/TRAINING PROGRAM

## 2017-09-22 PROCEDURE — A9270 NON-COVERED ITEM OR SERVICE: HCPCS | Mod: GY | Performed by: INTERNAL MEDICINE

## 2017-09-22 PROCEDURE — 85610 PROTHROMBIN TIME: CPT | Performed by: PEDIATRICS

## 2017-09-22 PROCEDURE — 97110 THERAPEUTIC EXERCISES: CPT | Mod: GP

## 2017-09-22 RX ORDER — ALBUTEROL SULFATE 0.83 MG/ML
2.5 SOLUTION RESPIRATORY (INHALATION) EVERY 6 HOURS PRN
Status: DISCONTINUED | OUTPATIENT
Start: 2017-09-22 | End: 2017-09-22 | Stop reason: HOSPADM

## 2017-09-22 RX ORDER — PREDNISONE 20 MG/1
40 TABLET ORAL DAILY
Qty: 3 TABLET | Refills: 0 | Status: SHIPPED | OUTPATIENT
Start: 2017-09-22 | End: 2017-09-22

## 2017-09-22 RX ORDER — PREDNISONE 20 MG/1
40 TABLET ORAL DAILY
Qty: 6 TABLET | Refills: 0 | Status: SHIPPED | OUTPATIENT
Start: 2017-09-22

## 2017-09-22 RX ORDER — LISINOPRIL 2.5 MG/1
2.5 TABLET ORAL DAILY
Status: DISCONTINUED | OUTPATIENT
Start: 2017-09-22 | End: 2017-09-22 | Stop reason: HOSPADM

## 2017-09-22 RX ORDER — CITALOPRAM HYDROBROMIDE 20 MG/1
20 TABLET ORAL DAILY
Status: DISCONTINUED | OUTPATIENT
Start: 2017-09-22 | End: 2017-09-22 | Stop reason: HOSPADM

## 2017-09-22 RX ORDER — IBUPROFEN 200 MG
400 TABLET ORAL ONCE
Status: COMPLETED | OUTPATIENT
Start: 2017-09-22 | End: 2017-09-22

## 2017-09-22 RX ORDER — METOPROLOL SUCCINATE 50 MG/1
50 TABLET, EXTENDED RELEASE ORAL DAILY
Status: DISCONTINUED | OUTPATIENT
Start: 2017-09-22 | End: 2017-09-22 | Stop reason: HOSPADM

## 2017-09-22 RX ORDER — IPRATROPIUM BROMIDE AND ALBUTEROL SULFATE 2.5; .5 MG/3ML; MG/3ML
3 SOLUTION RESPIRATORY (INHALATION) EVERY 4 HOURS
Status: DISCONTINUED | OUTPATIENT
Start: 2017-09-22 | End: 2017-09-22 | Stop reason: HOSPADM

## 2017-09-22 RX ORDER — IPRATROPIUM BROMIDE AND ALBUTEROL SULFATE 2.5; .5 MG/3ML; MG/3ML
3 SOLUTION RESPIRATORY (INHALATION) 4 TIMES DAILY
Status: DISCONTINUED | OUTPATIENT
Start: 2017-09-22 | End: 2017-09-22

## 2017-09-22 RX ADMIN — IPRATROPIUM BROMIDE AND ALBUTEROL SULFATE 3 ML: .5; 3 SOLUTION RESPIRATORY (INHALATION) at 12:38

## 2017-09-22 RX ADMIN — ASPIRIN 81 MG: 81 TABLET ORAL at 08:02

## 2017-09-22 RX ADMIN — IBUPROFEN 400 MG: 200 TABLET, FILM COATED ORAL at 14:05

## 2017-09-22 RX ADMIN — FLUTICASONE FUROATE AND VILANTEROL TRIFENATATE 1 PUFF: 200; 25 POWDER RESPIRATORY (INHALATION) at 08:02

## 2017-09-22 RX ADMIN — FOLIC ACID 1 MG: 1 TABLET ORAL at 08:02

## 2017-09-22 RX ADMIN — INFLUENZA A VIRUS A/MICHIGAN/45/2015 X-275 (H1N1) ANTIGEN (FORMALDEHYDE INACTIVATED), INFLUENZA A VIRUS A/HONG KONG/4801/2014 X-263B (H3N2) ANTIGEN (FORMALDEHYDE INACTIVATED), INFLUENZA B VIRUS B/PHUKET/3073/2013 ANTIGEN (FORMALDEHYDE INACTIVATED), AND INFLUENZA B VIRUS B/BRISBANE/60/2008 ANTIGEN (FORMALDEHYDE INACTIVATED) 0.5 ML: 15; 15; 15; 15 INJECTION, SUSPENSION INTRAMUSCULAR at 14:25

## 2017-09-22 RX ADMIN — LISINOPRIL 2.5 MG: 2.5 TABLET ORAL at 08:37

## 2017-09-22 RX ADMIN — CITALOPRAM HYDROBROMIDE 20 MG: 20 TABLET ORAL at 08:37

## 2017-09-22 RX ADMIN — UMECLIDINIUM 1 PUFF: 62.5 AEROSOL, POWDER ORAL at 08:02

## 2017-09-22 RX ADMIN — GABAPENTIN 300 MG: 300 CAPSULE ORAL at 08:02

## 2017-09-22 RX ADMIN — IPRATROPIUM BROMIDE AND ALBUTEROL SULFATE 3 ML: .5; 3 SOLUTION RESPIRATORY (INHALATION) at 00:53

## 2017-09-22 RX ADMIN — IPRATROPIUM BROMIDE AND ALBUTEROL SULFATE 3 ML: .5; 3 SOLUTION RESPIRATORY (INHALATION) at 08:58

## 2017-09-22 RX ADMIN — METOPROLOL SUCCINATE 50 MG: 50 TABLET, EXTENDED RELEASE ORAL at 08:37

## 2017-09-22 RX ADMIN — GABAPENTIN 300 MG: 300 CAPSULE ORAL at 14:05

## 2017-09-22 RX ADMIN — PREDNISONE 40 MG: 20 TABLET ORAL at 08:02

## 2017-09-22 NOTE — PROGRESS NOTES
09/22/17 1500   Quick Adds   Type of Visit Initial PT Evaluation   Living Environment   Lives With facility resident   Living Arrangements apartment   Number of Stairs to Enter Home 0   Number of Stairs Within Home 0   Living Environment Comment Pt lives at sober living house. No functional concerns.   Self-Care   Usual Activity Tolerance good   Current Activity Tolerance good   Regular Exercise yes   Activity/Exercise Type walking   Exercise Amount/Frequency daily   Equipment Currently Used at Home none   Activity/Exercise/Self-Care Comment Smokes and stays busy by walking regularly.   Functional Level Prior   Ambulation 0-->independent   Transferring 0-->independent   Prior Functional Level Comment Pt indep with mobility and c/o back pain limiting mobility mostly.   General Information   Onset of Illness/Injury or Date of Surgery - Date 09/20/17   Patient/Family Goals Statement Wants to improve back pain and strength   Pertinent History of Current Problem (include personal factors and/or comorbidities that impact the POC) Mr Montoya was seen by me on date 9/21/2017. 63 y/o M with PMH non ischemic cardiomyopathy (EF-45%), non Hodgkin lymphoma in remission, alcohol abuse relapse since last week now coming in with cough, shortness of breath and symptoms of withdrawal   Precautions/Limitations no known precautions/limitations   Cognitive Status Examination   Orientation orientation to person, place and time   Level of Consciousness alert   Follows Commands and Answers Questions 100% of the time   Personal Safety and Judgment intact   Memory intact   Cognitive Comment Some difficulty seeing bigger picture and role in his own healthcare.   Pain Assessment   Patient Currently in Pain Yes, see Vital Sign flowsheet   Integumentary/Edema   Integumentary/Edema no deficits were identifed   Posture    Posture Forward head position;Protracted shoulders   Posture Comments Mild   Range of Motion (ROM)   ROM Comment WFL  "  Strength   Strength Comments B LE strength WFL but underdeveloped vs rest of body. Likely weakness present in hips.   Bed Mobility   Bed Mobility Comments Indep   Transfer Skills   Transfer Comments Indep   Gait   Gait Comments Indep amb 20'   Balance   Balance Comments Indep with standing balance. Likely higher level issues present.   Sensory Examination   Sensory Perception no deficits were identified   General Therapy Interventions   Planned Therapy Interventions strengthening;stretching;home program guidelines;progressive activity/exercise   Clinical Impression   Criteria for Skilled Therapeutic Intervention yes, treatment indicated   PT Diagnosis Impaired function due to pain.   Influenced by the following impairments Back pain, LE weakness, reduced ROM   Functional limitations due to impairments Higher level balance and endurance   Clinical Presentation Stable/Uncomplicated   Clinical Presentation Rationale Clinical judgement   Clinical Decision Making (Complexity) Low complexity   Therapy Frequency` (1x eval and treat only)   Predicted Duration of Therapy Intervention (days/wks) 1x eval and treat   Anticipated Discharge Disposition Home  (No PT needs at this time)   Risk & Benefits of therapy have been explained Yes   Patient, Family & other staff in agreement with plan of care Yes   [facility] AM-PAC  \"6 Clicks\" V.2 Basic Mobility Inpatient Short Form   1. Turning from your back to your side while in a flat bed without using bedrails? 4 - None   2. Moving from lying on your back to sitting on the side of a flat bed without using bedrails? 4 - None   3. Moving to and from a bed to a chair (including a wheelchair)? 4 - None   4. Standing up from a chair using your arms (e.g., wheelchair, or bedside chair)? 4 - None   5. To walk in hospital room? 4 - None   6. Climbing 3-5 steps with a railing? 4 - None   Basic Mobility Raw Score (Score out of 24.Lower scores equate to lower levels of function) 24 "   Total Evaluation Time   Total Evaluation Time (Minutes) 10

## 2017-09-22 NOTE — DISCHARGE INSTRUCTIONS
Please continue 3 more days of prednisone   Also pulmonology referral for evaluation for COPD and smoking cessation  Resources provided for outpatient chemical dependency

## 2017-09-22 NOTE — PROGRESS NOTES
VSS.  A/Ox4.  Up ad dianna independently.  MSSA score of 5.  Ibuprofen given x1 for generalized pain.  Plan for pt to discharge today to shelter or detox.  Pt anxious and making repeated requests to talk to SW.  SW aware.  Pt pulled out his own PIV and threatened to leave AMA.  Continue to monitor per POC.

## 2017-09-22 NOTE — PROGRESS NOTES
AOx4.  VSS on RA.  Up ad dianna and steady on feet.  MSSA score: 8- pt asleep when writer went in to administer ativan., Bed alarm turned off- pt steady and calling appropriately.  Ambulated in halls and outside to smoke.  Reported numbness in RUE- possibly from sleeping position- CMS intact.  Pt able to make needs known.  Continue per POC.

## 2017-09-22 NOTE — PROVIDER NOTIFICATION
Paged [phone]:  Pt wondering if he will get his metoprolol and lisinopril today? Also requesting he get an order for citalopram. Thank you

## 2017-09-22 NOTE — PROGRESS NOTES
"/69 (BP Location: Left arm)  Pulse 101  Temp 97  F (36.1  C) (Oral)  Resp 18  Ht 1.854 m (6' 1\")  Wt 98.4 kg (217 lb)  SpO2 91%  BMI 28.63 kg/m2  Pt alert and oriented x4.  Up independently.  Denies pain, but did request ibuprofen for generalized malaise.  Endorses nausea; did eat 100% dinner and sandwich.  No vomiting or diarrhea.  Mild tremor.  No overt signs of withdrawal.  Voiding spontaneously.  Tachycardic.  Cont with POC.  "

## 2017-09-22 NOTE — PLAN OF CARE
Problem: Patient Care Overview  Goal: Plan of Care/Patient Progress Review  PT 5B: Pt indep with mobility. Issued and performed LE and lumbar ROM/strengthening HEP. Also gave flexion protocol for back pain (knee to chest and seated lumbar flexion). Tolerated well. No further needs identified and will sign off. Recommend pt discharge home with PRN assist. Otherwise can care for himself indep.

## 2017-09-22 NOTE — PLAN OF CARE
Problem: Patient Care Overview  Goal: Plan of Care/Patient Progress Review  OT 5A: Per discussion with PT, pt with no acute OT needs. Will sign off.

## 2017-09-23 NOTE — DISCHARGE SUMMARY
[facility], [place]    Internal Medicine Discharge Summary- Gold Service    Date of Admission:  9/20/2017  Date of Discharge:  9/22/2017  3:50 PM  Discharging Provider: Katie Yañez  Discharge Team: Gold 1    Discharge Diagnoses   Alcohol abuse with intoxication  Acute bronchitis  Paroxysmal atrial fibrillation    Follow-ups Needed After Discharge   Follow up with pulmonology in 1month for evaluation with PFT    Hospital Course   Richard Montoya was admitted on 9/20/2017 for cough, shortness of breath and symptoms of alcohol withdrawal.  The following problems were addressed during his hospitalization:    61 y/o M presented with h/o HTN, HLD, atrial fibrillation, cardiomyopathy (EF 35%), non hodgkin's lymphoma, alcohol abuse presented with concern for shortness of breath and alcohol withdrawal. He had been sober for 7 months and living at sober house and went to recent binge drinking 2 days ago and drank 3 pint of vodka one day before admission.    # Alcohol withdrawal  Patient reported last drink was 9/20/17 with history of serious withdrawal symptoms including DTs in the past  He was admitted and MSSA protocol initiated-score initially around 9. Trended down to 5 and below with no further need for oral benzodiazepines on discharge     # Acute hypoxic respiratory failure  # Acute bronchitis  - according to patient only h/o childhood asthma. No h/o known COPD from before but patient is a chronic smoker. Also has been using advair, spiriva, albuterol inhaler.   This time he is coming here with h/o cough and wheezing; chest xray wnl and procalcitonin wnl  Started on prednisone to complete 5 day course for acute bronchitis.  COPD coordinator was consulted during stay; plan to follow with pulmonologist as outpatient and get complete PFTs.     - Chronic medical problems  # CAD - continue PTA aspirin, metoprolol,   # HTN - continue lisinopril  # Smoking - continue PTA nicotine lozenge  #  Atrial fibrillation - likely not candidate for anticoagulation for fall risk due to alcoholism     He was discharged to [place] detox   Consultations This Hospital Stay   SOCIAL WORK IP CONSULT  MEDICATION HISTORY IP PHARMACY CONSULT  OCCUPATIONAL THERAPY ADULT IP CONSULT  PHYSICAL THERAPY ADULT IP CONSULT  VASCULAR ACCESS CARE ADULT IP CONSULT     Code Status   Full Code    Time Spent on this Encounter   I, Katie Yañez, personally saw the patient today and spent greater than 30 minutes discharging this patient.       quinten Pari  Internal Medicine Staff Hospitalist Service  [facility]  Pager:[phone]  ______________________________________________________________________    Physical Exam   Vital Signs: Temp: 96.6  F (35.9  C) Temp src: Oral BP: 134/65 Pulse: 96   Resp: 16 SpO2: 97 % O2 Device: None (Room air)    Weight: 217 lbs 0 oz    General Appearance: Pt looked better on exam  Respiratory: B/l decreased breath sounds with no added sounds  Cardiovascular: s1s2 with no murmur  GI: soft, non tender, bowel sounds noted  Skin: no rash  Neuro: AAOx3, b/l UE and LE-5/5    Significant Results and Procedures   Most Recent 3 CBC's:  Recent Labs   Lab Test  09/22/17   0652  09/20/17   1816  01/20/17   1502   WBC  11.2*  11.1*  7.0   HGB  12.7*  13.9  15.3   MCV  90  90  103*   PLT  154  188  190       Pending Results   Unresulted Labs Ordered in the Past 30 Days of this Admission     No orders found from 7/22/2017 to 9/21/2017.             Primary Care Physician   Lui Carballo    Discharge Disposition   [place] detox  Condition at discharge: Stable    Discharge Orders     PULMONARY MEDICINE REFERRAL     Reason for your hospital stay   You were admitted with cough, shortness of breath and were admitted after being drunk and concern for withdrawal  You will need evaluation for COPD as an outpatient     Adult [facility] Follow-up and recommended labs and tests   Follow up with primary care provider,  Lui Carballo, within 7 days for hospital follow- up.    Follow up with pulmology - referral provided in 3 weeks     Appointments on [place] and/or [facility] (with [facility] or [facility] provider or service). Call [phone] if you haven't heard regarding these appointments within 7 days of discharge.     Activity   Your activity upon discharge: activity as tolerated     Discharge Instructions   Please follow with pulmonology in 3 weeks     Full Code     Diet   Follow this diet upon discharge: Orders Placed This Encounter     Room Service     Regular Diet Adult       Discharge Medications   Discharge Medication List as of 9/22/2017  4:10 PM      CONTINUE these medications which have CHANGED    Details   predniSONE (DELTASONE) 20 MG tablet Take 2 tablets (40 mg) by mouth daily, Disp-6 tablet, R-0, E-Prescribe         CONTINUE these medications which have NOT CHANGED    Details   lisinopril (PRINIVIL,ZESTRIL) 2.5 MG tablet Take 1 tablet (2.5 mg) by mouth daily, Disp-30 tablet, R-3, Fax      metoprolol (TOPROL-XL) 50 MG 24 hr tablet Take 1 tablet (50 mg) by mouth daily, Disp-30 tablet, R-3, Fax      furosemide (LASIX) 20 MG tablet Take 1 tablet (20 mg) by mouth daily, Disp-30 tablet, R-3, Fax      acetaminophen (TYLENOL) 500 MG tablet Take 1 tablet (500 mg) by mouth 4 times daily, Transitional      folic acid (FOLVITE) 1 MG tablet Take 1 tablet (1 mg) by mouth daily, Disp-30 tablet, Transitional      multivitamin, therapeutic with minerals (THERA-VIT-M) TABS Take 1 tablet by mouth daily, Disp-30 each, R-0, Transitional      Aspirin 81 MG TBDP Take 81 mg by mouth daily, Disp-30 tablet, R-1, Fax      fluticasone-salmeterol (ADVAIR) 500-50 MCG/DOSE diskus inhaler Inhale 1 puff into the lungs every 12 hours, Disp-60 Inhaler, R-1, Fax      tiotropium (SPIRIVA) 18 MCG inhalation capsule Inhale 1 capsule (18 mcg) into the lungs daily, Disp-30 capsule, R-1, Fax      gabapentin (NEURONTIN) 300 MG capsule Take 1 capsule (300  mg) by mouth 3 times daily, Disp-90 capsule, R-1, Fax      citalopram (CELEXA) 20 MG tablet Take 1 tablet (20 mg) by mouth daily, Disp-30 tablet, R-1, Fax      cholecalciferol 1000 UNITS TABS Take 1,000 Units by mouth daily, Disp-90 tablet, R-1, Fax      albuterol (PROAIR HFA, PROVENTIL HFA, VENTOLIN HFA) 108 (90 BASE) MCG/ACT inhaler Inhale 2 puffs into the lungs every 4 hours as needed for shortness of breath / dyspnea, Disp-1 Inhaler, R-0, Transitional      senna-docusate (SENOKOT-S;PERICOLACE) 8.6-50 MG per tablet Take 2 tablets by mouth 2 times daily as needed for constipation, Disp-100 tablet, Transitional      loperamide (IMODIUM) 2 MG capsule Take 2 mg by mouth 4 times daily as needed for diarrhea, Historical      sodium chloride (OCEAN) 0.65 % nasal spray Spray 1 spray into both nostrils daily as needed for congestion, Disp-1 Bottle, R-2, Fax         STOP taking these medications       aspirin 81 MG tablet Comments:   Reason for Stopping:         nicotine polacrilex (COMMIT) 2 MG lozenge Comments:   Reason for Stopping:             Allergies   Allergies   Allergen Reactions     Sudafed [Pseudoephedrine] Other (See Comments)     Patient reports that he turns red.      Red Dye      Patient reports that he's only had issues with the red dye in Sudafed, he's tolerated other medications that contain red dye.

## 2017-12-16 ENCOUNTER — HOSPITAL ENCOUNTER (EMERGENCY)
Facility: CLINIC | Age: 62
Discharge: HOME | End: 2017-12-16
Attending: FAMILY MEDICINE | Admitting: FAMILY MEDICINE
Payer: MEDICARE

## 2017-12-16 ENCOUNTER — APPOINTMENT (OUTPATIENT)
Dept: GENERAL RADIOLOGY | Facility: CLINIC | Age: 62
End: 2017-12-16
Attending: FAMILY MEDICINE
Payer: MEDICARE

## 2017-12-16 VITALS
SYSTOLIC BLOOD PRESSURE: 153 MMHG | HEART RATE: 82 BPM | RESPIRATION RATE: 18 BRPM | OXYGEN SATURATION: 94 % | DIASTOLIC BLOOD PRESSURE: 98 MMHG | TEMPERATURE: 98.2 F

## 2017-12-16 DIAGNOSIS — F10.220 ACUTE ALCOHOLIC INTOXICATION IN ALCOHOLISM WITHOUT COMPLICATION (H): ICD-10-CM

## 2017-12-16 LAB
ALBUMIN SERPL-MCNC: 3.3 G/DL (ref 3.4–5)
ALCOHOL BREATH TEST: 0.02 (ref 0–0.01)
ALCOHOL BREATH TEST: 0.18 (ref 0–0.01)
ALP SERPL-CCNC: 74 U/L (ref 40–150)
ALT SERPL W P-5'-P-CCNC: 53 U/L (ref 0–70)
ANION GAP SERPL CALCULATED.3IONS-SCNC: 11 MMOL/L (ref 3–14)
AST SERPL W P-5'-P-CCNC: 47 U/L (ref 0–45)
BASOPHILS # BLD AUTO: 0.1 10E9/L (ref 0–0.2)
BASOPHILS NFR BLD AUTO: 0.8 %
BILIRUB SERPL-MCNC: 0.4 MG/DL (ref 0.2–1.3)
BUN SERPL-MCNC: 32 MG/DL (ref 7–30)
CALCIUM SERPL-MCNC: 8.8 MG/DL (ref 8.5–10.1)
CHLORIDE SERPL-SCNC: 108 MMOL/L (ref 94–109)
CO2 SERPL-SCNC: 26 MMOL/L (ref 20–32)
CREAT SERPL-MCNC: 1.13 MG/DL (ref 0.66–1.25)
DIFFERENTIAL METHOD BLD: ABNORMAL
EOSINOPHIL # BLD AUTO: 0.2 10E9/L (ref 0–0.7)
EOSINOPHIL NFR BLD AUTO: 2.8 %
ERYTHROCYTE [DISTWIDTH] IN BLOOD BY AUTOMATED COUNT: 14.9 % (ref 10–15)
GFR SERPL CREATININE-BSD FRML MDRD: 65 ML/MIN/1.7M2
GLUCOSE SERPL-MCNC: 92 MG/DL (ref 70–99)
HCT VFR BLD AUTO: 42.4 % (ref 40–53)
HGB BLD-MCNC: 13.3 G/DL (ref 13.3–17.7)
IMM GRANULOCYTES # BLD: 0 10E9/L (ref 0–0.4)
IMM GRANULOCYTES NFR BLD: 0.2 %
LYMPHOCYTES # BLD AUTO: 2 10E9/L (ref 0.8–5.3)
LYMPHOCYTES NFR BLD AUTO: 32.1 %
MCH RBC QN AUTO: 29.6 PG (ref 26.5–33)
MCHC RBC AUTO-ENTMCNC: 31.4 G/DL (ref 31.5–36.5)
MCV RBC AUTO: 94 FL (ref 78–100)
MONOCYTES # BLD AUTO: 0.6 10E9/L (ref 0–1.3)
MONOCYTES NFR BLD AUTO: 10.5 %
NEUTROPHILS # BLD AUTO: 3.3 10E9/L (ref 1.6–8.3)
NEUTROPHILS NFR BLD AUTO: 53.6 %
NRBC # BLD AUTO: 0 10*3/UL
NRBC BLD AUTO-RTO: 0 /100
PLATELET # BLD AUTO: 200 10E9/L (ref 150–450)
POTASSIUM SERPL-SCNC: 3.8 MMOL/L (ref 3.4–5.3)
PROT SERPL-MCNC: 6.6 G/DL (ref 6.8–8.8)
RBC # BLD AUTO: 4.5 10E12/L (ref 4.4–5.9)
SODIUM SERPL-SCNC: 145 MMOL/L (ref 133–144)
TROPONIN I BLD-MCNC: 0.02 UG/L (ref 0–0.1)
WBC # BLD AUTO: 6.1 10E9/L (ref 4–11)

## 2017-12-16 PROCEDURE — 80053 COMPREHEN METABOLIC PANEL: CPT | Performed by: FAMILY MEDICINE

## 2017-12-16 PROCEDURE — 85025 COMPLETE CBC W/AUTO DIFF WBC: CPT | Performed by: FAMILY MEDICINE

## 2017-12-16 PROCEDURE — 25000132 ZZH RX MED GY IP 250 OP 250 PS 637: Mod: GY | Performed by: EMERGENCY MEDICINE

## 2017-12-16 PROCEDURE — 93010 ELECTROCARDIOGRAM REPORT: CPT | Mod: Z6 | Performed by: FAMILY MEDICINE

## 2017-12-16 PROCEDURE — 99284 EMERGENCY DEPT VISIT MOD MDM: CPT | Mod: 25 | Performed by: FAMILY MEDICINE

## 2017-12-16 PROCEDURE — 25000128 H RX IP 250 OP 636

## 2017-12-16 PROCEDURE — 82075 ASSAY OF BREATH ETHANOL: CPT | Performed by: FAMILY MEDICINE

## 2017-12-16 PROCEDURE — 84484 ASSAY OF TROPONIN QUANT: CPT

## 2017-12-16 PROCEDURE — 99285 EMERGENCY DEPT VISIT HI MDM: CPT | Mod: 25 | Performed by: FAMILY MEDICINE

## 2017-12-16 PROCEDURE — 96360 HYDRATION IV INFUSION INIT: CPT | Performed by: FAMILY MEDICINE

## 2017-12-16 PROCEDURE — 71010 XR CHEST PORT 1 VW: CPT

## 2017-12-16 PROCEDURE — 93005 ELECTROCARDIOGRAM TRACING: CPT | Performed by: FAMILY MEDICINE

## 2017-12-16 PROCEDURE — 96361 HYDRATE IV INFUSION ADD-ON: CPT | Performed by: FAMILY MEDICINE

## 2017-12-16 PROCEDURE — A9270 NON-COVERED ITEM OR SERVICE: HCPCS | Mod: GY | Performed by: EMERGENCY MEDICINE

## 2017-12-16 RX ORDER — SODIUM CHLORIDE 9 MG/ML
INJECTION, SOLUTION INTRAVENOUS ONCE
Status: COMPLETED | OUTPATIENT
Start: 2017-12-16 | End: 2017-12-16

## 2017-12-16 RX ORDER — LORAZEPAM 1 MG/1
1 TABLET ORAL ONCE
Status: COMPLETED | OUTPATIENT
Start: 2017-12-16 | End: 2017-12-16

## 2017-12-16 RX ORDER — CALCIUM CARBONATE 500 MG/1
500 TABLET, CHEWABLE ORAL DAILY PRN
Status: DISCONTINUED | OUTPATIENT
Start: 2017-12-16 | End: 2017-12-17 | Stop reason: HOSPADM

## 2017-12-16 RX ORDER — SODIUM CHLORIDE 9 MG/ML
INJECTION, SOLUTION INTRAVENOUS
Status: COMPLETED
Start: 2017-12-16 | End: 2017-12-16

## 2017-12-16 RX ADMIN — CALCIUM CARBONATE (ANTACID) CHEW TAB 500 MG 500 MG: 500 CHEW TAB at 21:38

## 2017-12-16 RX ADMIN — SODIUM CHLORIDE: 9 INJECTION, SOLUTION INTRAVENOUS at 16:43

## 2017-12-16 RX ADMIN — LORAZEPAM 1 MG: 1 TABLET ORAL at 23:09

## 2017-12-16 NOTE — ED PROVIDER NOTES
History     Chief Complaint   Patient presents with     Alcohol Intoxication     homeless, patient called 911, pt states that he had 2 pints of vodka today     HPI  Richard Montoya is a 62 year old male who presents via 911. [facility] [employer]  [facility]. He is intoxicated and gives no real hx the is coherent. He states his chest hurts but is not able to tell me how long or other info. He states he has a cough- smoker. The past medical hx is afib, chronic and severe alcoholism. He is noted to have a cardiomyopathy with ejection fraction of 35 %. On last admission - his medications should be lisinopril, asa, lopressor. He was felt not to be a candidate for anticoag due to etohism.    I have reviewed the Medications, Allergies, Past Medical and Surgical History, and Social History in the Epic system.    Review of Systems    Physical Exam   BP: (!) 85/58  Pulse: 78  Temp: 97.8  F (36.6  C)  Resp: 18  SpO2: 95 %      Physical Exam   Constitutional:   Smells of etoh and tobacco  Will not cooperate. Will not stay on bed. He has cough.  Speaks in full sentences without evidence for resp distress.    Warm and dry extremities   Cardiovascular: Normal rate, regular rhythm, normal heart sounds and intact distal pulses.    No murmur heard.  Pulmonary/Chest: No respiratory distress. He exhibits tenderness.   Diffuse wheezes. The pt has marked localized chest wall tenderness over the right sternal rib junctions. He rips my hand away   Abdominal:   Obese abd, soft and not tender   Musculoskeletal: He exhibits no edema.   Nursing note and vitals reviewed.      ED Course     ED Course     Procedures        EKG done. Well controlled a fib. Rate of 71. RBBB and LAD. These are chronic when compared to ekg of 9/20/17. There are no acute st segment changes and normal intervals. No change from previous ekgs    CXR normal- no acute changes.    Labs cbc and cmp are normal    In review of old records the pt has BP around 100 and heart  rates in normal range.  He has sats normally in low 90's on ra.    The pt seems to be at his baseline. He is drunk.  There appears to be no acute medical issues.  He will need to sober.  He is not suicidal nor homicidal and there is no delusions or hallucinations  Labs Ordered and Resulted from Time of ED Arrival Up to the Time of Departure from the ED   CBC WITH PLATELETS DIFFERENTIAL - Abnormal; Notable for the following:        Result Value    MCHC 31.4 (*)     All other components within normal limits   COMPREHENSIVE METABOLIC PANEL - Abnormal; Notable for the following:     Sodium 145 (*)     Urea Nitrogen 32 (*)     Albumin 3.3 (*)     Protein Total 6.6 (*)     AST 47 (*)     All other components within normal limits   ALCOHOL BREATH TEST POCT - Abnormal; Notable for the following:     Alcohol Breath Test 0.179 (*)     All other components within normal limits   DRUG ABUSE SCREEN 6 CHEM DEP URINE ([facility])   MAY SALINE LOCK IV   ISTAT TROPONIN NURSING POCT   TROPONIN POCT            Assessments & Plan (with Medical Decision Making)   The pt's care will be given to Dr Rodriguez. The pt will sober.    I have reviewed the nursing notes.    I have reviewed the findings, diagnosis, plan and need for follow up with the patient.    New Prescriptions    No medications on file       Final diagnoses:   Acute alcoholic intoxication in alcoholism without complication (H)       12/16/2017   [facility] [place], EMERGENCY DEPARTMENT     Bertin Ovalle MD  12/16/17 1855

## 2017-12-16 NOTE — ED AVS SNAPSHOT
[facility], Emergency Department    [address]    [place] [address]    Phone:  [phone]    Fax:  [phone]                                       Richard Montoya   MRN: [id number]    Department:  [facility], Emergency Department   Date of Visit:  12/16/2017           Patient Information     Date Of Birth          1955        Your diagnoses for this visit were:     Acute alcoholic intoxication in alcoholism without complication (H)        You were seen by Bertin Ovalle MD.        Discharge Instructions         Alcohol Addiction  Are you addicted to alcohol?    You may be addicted to alcohol if your drinking harms yourself or others or leads to other problems with your daily life.  The medical term for this is alcohol use disorder. Your healthcare provider may diagnose you with this disorder if you have at least two of the following problems within the span of a year:    You drink alcohol in larger amounts or for a longer period than you intended.    You frequently want to cut down or control alcohol use, or have frequently failed efforts to do so.    You spend a lot of time getting alcohol, using alcohol, or recovering from alcohol use.    You crave or have a strong desire or urge to drink alcohol.    Ongoing alcohol use makes it hard for you to be responsible at work, school, or home.    You continue to use alcohol even though you have had problems in relationships or social settings because of it.    You give up or miss important social, work, or recreational activities because of alcohol use.    You drink alcohol in situations in which it is physically unsafe, such as drinking then driving while intoxicated.    You continue to drink alcohol despite knowing it has caused physical or emotional problems.    You need more and more alcohol to get the same effects.    You hide how much alcohol you drink from family and friends.    You have withdrawal symptoms or use alcohol to avoid withdrawal  symptoms.  Date Last Reviewed: 2/1/2017 2000-2017 [employer]. [address] [place] [address], [place] [address]. All rights reserved. This information is not intended as a substitute for professional medical care. Always follow your healthcare professional's instructions.      Please make an appointment to follow up with Your Primary Care Provider as soon as possible.  There is a bed available and being held for you tonight at [facility], [address]       24 Hour Appointment Hotline       To make an appointment at any [facility], call [phone] ([phone]). If you don't have a family doctor or clinic, we will help you find one. [facility] are conveniently located to serve the needs of you and your family.             Review of your medicines      Our records show that you are taking the medicines listed below. If these are incorrect, please call your family doctor or clinic.        Dose / Directions Last dose taken    acetaminophen 500 MG tablet   Commonly known as:  TYLENOL   Dose:  500 mg        Take 1 tablet (500 mg) by mouth 4 times daily   Refills:  0        albuterol 108 (90 BASE) MCG/ACT Inhaler   Commonly known as:  PROAIR HFA/PROVENTIL HFA/VENTOLIN HFA   Dose:  2 puff   Quantity:  1 Inhaler        Inhale 2 puffs into the lungs every 4 hours as needed for shortness of breath / dyspnea   Refills:  0        Aspirin 81 MG Tbdp   Dose:  81 mg   Quantity:  30 tablet        Take 81 mg by mouth daily   Refills:  1        cholecalciferol 1000 UNITS Tabs   Dose:  1000 Units   Quantity:  90 tablet        Take 1,000 Units by mouth daily   Refills:  1        citalopram 20 MG tablet   Commonly known as:  celeXA   Dose:  20 mg   Quantity:  30 tablet        Take 1 tablet (20 mg) by mouth daily   Refills:  1        fluticasone-salmeterol 500-50 MCG/DOSE diskus inhaler   Commonly known as:  ADVAIR   Dose:  1 puff   Quantity:  60 Inhaler        Inhale 1 puff into the  lungs every 12 hours   Refills:  1        folic acid 1 MG tablet   Commonly known as:  FOLVITE   Dose:  1 mg   Quantity:  30 tablet        Take 1 tablet (1 mg) by mouth daily   Refills:  0        furosemide 20 MG tablet   Commonly known as:  LASIX   Dose:  20 mg   Quantity:  30 tablet        Take 1 tablet (20 mg) by mouth daily   Refills:  3        gabapentin 300 MG capsule   Commonly known as:  NEURONTIN   Dose:  300 mg   Quantity:  90 capsule        Take 1 capsule (300 mg) by mouth 3 times daily   Refills:  1        lisinopril 2.5 MG tablet   Commonly known as:  PRINIVIL/Zestril   Dose:  2.5 mg   Quantity:  30 tablet        Take 1 tablet (2.5 mg) by mouth daily   Refills:  3        loperamide 2 MG capsule   Commonly known as:  IMODIUM   Dose:  2 mg        Take 2 mg by mouth 4 times daily as needed for diarrhea   Refills:  0        metoprolol 50 MG 24 hr tablet   Commonly known as:  TOPROL-XL   Dose:  50 mg   Quantity:  30 tablet        Take 1 tablet (50 mg) by mouth daily   Refills:  3        multivitamin, therapeutic with minerals Tabs tablet   Dose:  1 tablet   Quantity:  30 each        Take 1 tablet by mouth daily   Refills:  0        predniSONE 20 MG tablet   Commonly known as:  DELTASONE   Dose:  40 mg   Quantity:  6 tablet        Take 2 tablets (40 mg) by mouth daily   Refills:  0        senna-docusate 8.6-50 MG per tablet   Commonly known as:  SENOKOT-S;PERICOLACE   Dose:  2 tablet   Quantity:  100 tablet        Take 2 tablets by mouth 2 times daily as needed for constipation   Refills:  0        sodium chloride 0.65 % nasal spray   Commonly known as:  OCEAN   Dose:  1 spray   Quantity:  1 Bottle        Spray 1 spray into both nostrils daily as needed for congestion   Refills:  2        tiotropium 18 MCG capsule   Commonly known as:  SPIRIVA   Dose:  18 mcg   Quantity:  30 capsule        Inhale 1 capsule (18 mcg) into the lungs daily   Refills:  1                Procedures and tests performed during your  "visit     Procedure/Test Number of Times Performed    Alcohol breath test POCT 2    CBC with platelets differential 1    Chest  XR, 1 view portable 1    Comprehensive metabolic panel 1    EKG 12 lead 1    ISTAT troponin POCT 1    Saline Lock IV 1    Troponin POCT 1      Orders Needing Specimen Collection     Ordered          12/16/17 1155  Drug abuse screen 6 urine (chem dep) - ROUTINE, Pri[facility]: Routine, Needs to be Collected     Scheduled Task Status   12/16/17 1156 Collect Drug abuse screen 6 urine (chem dep) Open   Order Class:  PCU Collect                  Pending Results     Date and Time Order Name Status Description    12/16/2017 1200 EKG 12 lead Preliminary             Pending Culture Results     No orders found from 12/14/2017 to 12/17/2017.            Pending Results Instructions     If you had any lab results that were not finalized at the time of your Discharge, you can call the ED Lab Result RN at [phone]. You will be contacted by this team for any positive Lab results or changes in treatment. The nurses are available 7 days a week from 10A to 6:30P.  You can leave a message 24 hours per day and they will return your call.        Thank you for choosing [facility]       Thank you for choosing [facility] for your care. Our goal is always to provide you with excellent care. Hearing back from our patients is one way we can continue to improve our services. Please take a few minutes to complete the written survey that you may receive in the mail after you visit with us. Thank you!        [employer] Information     [employer] lets you send messages to your doctor, view your test results, renew your prescriptions, schedule appointments and more. To sign up, go to [email or web address] . Click on \"Log in\" on the left side of the screen, which will take you to the Welcome page. Then click on \"Sign up Now\" on the right side of the page.     You will be asked to enter the access code listed below, as well as some " personal information. Please follow the directions to create your username and password.     Your access code is: [email or web address]  Expires: 2017  2:10 PM     Your access code will  in 90 days. If you need help or a new code, please call your [facility] clinic or [phone].        Care EveryWhere ID     This is your Care EveryWhere ID. This could be used by other organizations to access your [facility] medical records  [id number]        Equal Access to Services     AD EDOUARD : Hadii aad ku hadasho Soomaali, waaxda luqadaha, qaybta kaalmada adeegyacorky, gertrude wood . So [facility] [phone].    ATENCIÓN: Si habla español, tiene a salazar disposición servicios gratuitos de asistencia lingüística. Llame al [phone].    We comply with applicable federal civil rights laws and [place] laws. We do not discriminate on the basis of race, color, national origin, age, disability, sex, sexual orientation, or gender identity.            After Visit Summary       This is your record. Keep this with you and show to your community pharmacist(s) and doctor(s) at your next visit.
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
## 2017-12-16 NOTE — ED AVS SNAPSHOT
[facility] [place], Emergency Department    [address] [place] [place] [address]    Phone:  [phone]    Fax:  [phone]                                       Richard Montoya   MRN: [id number]    Department:  [facility], Emergency Department   Date of Visit:  12/16/2017           After Visit Summary Signature Page     I have received my discharge instructions, and my questions have been answered. I have discussed any challenges I see with this plan with the nurse or doctor.    ..........................................................................................................................................  Patient/Patient Representative Signature      ..........................................................................................................................................  Patient Representative Print Name and Relationship to Patient    ..................................................               ................................................  Date                                            Time    ..........................................................................................................................................  Reviewed by Signature/Title    ...................................................              ..............................................  Date                                                            Time

## 2017-12-17 NOTE — DISCHARGE INSTRUCTIONS
Alcohol Addiction  Are you addicted to alcohol?    You may be addicted to alcohol if your drinking harms yourself or others or leads to other problems with your daily life.  The medical term for this is alcohol use disorder. Your healthcare provider may diagnose you with this disorder if you have at least two of the following problems within the span of a year:    You drink alcohol in larger amounts or for a longer period than you intended.    You frequently want to cut down or control alcohol use, or have frequently failed efforts to do so.    You spend a lot of time getting alcohol, using alcohol, or recovering from alcohol use.    You crave or have a strong desire or urge to drink alcohol.    Ongoing alcohol use makes it hard for you to be responsible at work, school, or home.    You continue to use alcohol even though you have had problems in relationships or social settings because of it.    You give up or miss important social, work, or recreational activities because of alcohol use.    You drink alcohol in situations in which it is physically unsafe, such as drinking then driving while intoxicated.    You continue to drink alcohol despite knowing it has caused physical or emotional problems.    You need more and more alcohol to get the same effects.    You hide how much alcohol you drink from family and friends.    You have withdrawal symptoms or use alcohol to avoid withdrawal symptoms.  Date Last Reviewed: 2/1/2017 2000-2017 [employer]. [address] [facility], [place] [address]. All rights reserved. This information is not intended as a substitute for professional medical care. Always follow your healthcare professional's instructions.      Please make an appointment to follow up with Your Primary Care Provider as soon as possible.  There is a bed available and being held for you tonight at [facility], [address]

## 2017-12-17 NOTE — ED PROVIDER NOTES
The patient was signed out to me by Dr. Ovalle at shift change.  Please see his note for the initial history and emergency department course.  At the time of signout, patient was being observed until sober.  I spoke with him at the time of discharge.  He is feeling better.  He reports having heartburn feeling and he asked for and was given TUMS.  Patient is homeless and so arrangements were made for him to go to [facility] at [address]  They have a bed for him and are expecting him tonight.  He will be sent there via cab.     Faith Rodriguez MD  12/16/17 8587

## 2017-12-19 LAB — INTERPRETATION ECG - MUSE: NORMAL
